# Patient Record
Sex: MALE | Race: OTHER | Employment: FULL TIME | URBAN - METROPOLITAN AREA
[De-identification: names, ages, dates, MRNs, and addresses within clinical notes are randomized per-mention and may not be internally consistent; named-entity substitution may affect disease eponyms.]

---

## 2017-01-04 ENCOUNTER — OFFICE VISIT (OUTPATIENT)
Dept: BEHAVIORAL/MENTAL HEALTH | Age: 52
End: 2017-01-04

## 2017-01-04 ENCOUNTER — TELEPHONE (OUTPATIENT)
Dept: BEHAVIORAL/MENTAL HEALTH | Age: 52
End: 2017-01-04

## 2017-01-04 DIAGNOSIS — G47.00 INSOMNIA, UNSPECIFIED TYPE: ICD-10-CM

## 2017-01-04 DIAGNOSIS — G47.30 SLEEP APNEA, UNSPECIFIED TYPE: Primary | ICD-10-CM

## 2017-01-04 PROCEDURE — 90791 PSYCH DIAGNOSTIC EVALUATION: CPT | Performed by: PSYCHOLOGIST

## 2017-01-04 ASSESSMENT — PATIENT HEALTH QUESTIONNAIRE - PHQ9
4. FEELING TIRED OR HAVING LITTLE ENERGY: 1
SUM OF ALL RESPONSES TO PHQ9 QUESTIONS 1 & 2: 1
2. FEELING DOWN, DEPRESSED OR HOPELESS: 1
6. FEELING BAD ABOUT YOURSELF - OR THAT YOU ARE A FAILURE OR HAVE LET YOURSELF OR YOUR FAMILY DOWN: 0
5. POOR APPETITE OR OVEREATING: 1
SUM OF ALL RESPONSES TO PHQ QUESTIONS 1-9: 5
1. LITTLE INTEREST OR PLEASURE IN DOING THINGS: 0
3. TROUBLE FALLING OR STAYING ASLEEP: 1
9. THOUGHTS THAT YOU WOULD BE BETTER OFF DEAD, OR OF HURTING YOURSELF: 0
7. TROUBLE CONCENTRATING ON THINGS, SUCH AS READING THE NEWSPAPER OR WATCHING TELEVISION: 1
8. MOVING OR SPEAKING SO SLOWLY THAT OTHER PEOPLE COULD HAVE NOTICED. OR THE OPPOSITE, BEING SO FIGETY OR RESTLESS THAT YOU HAVE BEEN MOVING AROUND A LOT MORE THAN USUAL: 0
10. IF YOU CHECKED OFF ANY PROBLEMS, HOW DIFFICULT HAVE THESE PROBLEMS MADE IT FOR YOU TO DO YOUR WORK, TAKE CARE OF THINGS AT HOME, OR GET ALONG WITH OTHER PEOPLE: 1

## 2017-01-12 ENCOUNTER — OFFICE VISIT (OUTPATIENT)
Dept: PRIMARY CARE CLINIC | Age: 52
End: 2017-01-12

## 2017-01-12 VITALS
BODY MASS INDEX: 33.07 KG/M2 | WEIGHT: 231 LBS | HEIGHT: 70 IN | OXYGEN SATURATION: 96 % | SYSTOLIC BLOOD PRESSURE: 136 MMHG | HEART RATE: 107 BPM | DIASTOLIC BLOOD PRESSURE: 68 MMHG | TEMPERATURE: 98.5 F

## 2017-01-12 DIAGNOSIS — J01.00 SUBACUTE MAXILLARY SINUSITIS: ICD-10-CM

## 2017-01-12 DIAGNOSIS — J20.9 ACUTE BRONCHITIS, UNSPECIFIED ORGANISM: Primary | ICD-10-CM

## 2017-01-12 PROCEDURE — 99213 OFFICE O/P EST LOW 20 MIN: CPT | Performed by: FAMILY MEDICINE

## 2017-01-12 PROCEDURE — 96372 THER/PROPH/DIAG INJ SC/IM: CPT | Performed by: FAMILY MEDICINE

## 2017-01-12 RX ORDER — CEFTRIAXONE 1 G/1
1 INJECTION, POWDER, FOR SOLUTION INTRAMUSCULAR; INTRAVENOUS ONCE
Status: COMPLETED | OUTPATIENT
Start: 2017-01-12 | End: 2017-01-12

## 2017-01-12 RX ORDER — AZITHROMYCIN 250 MG/1
TABLET, FILM COATED ORAL
Qty: 1 PACKET | Refills: 0 | Status: SHIPPED | OUTPATIENT
Start: 2017-01-12 | End: 2017-01-16 | Stop reason: SDUPTHER

## 2017-01-12 RX ORDER — BENZONATATE 100 MG/1
200 CAPSULE ORAL 3 TIMES DAILY PRN
Qty: 50 CAPSULE | Refills: 1 | Status: SHIPPED | OUTPATIENT
Start: 2017-01-12 | End: 2017-01-16 | Stop reason: SDUPTHER

## 2017-01-12 RX ADMIN — CEFTRIAXONE 1 G: 1 INJECTION, POWDER, FOR SOLUTION INTRAMUSCULAR; INTRAVENOUS at 12:01

## 2017-01-12 ASSESSMENT — ENCOUNTER SYMPTOMS
VOICE CHANGE: 1
SHORTNESS OF BREATH: 1
SORE THROAT: 1
COUGH: 1
ABDOMINAL PAIN: 0

## 2017-01-16 ENCOUNTER — TELEPHONE (OUTPATIENT)
Dept: PRIMARY CARE CLINIC | Age: 52
End: 2017-01-16

## 2017-01-16 RX ORDER — AZITHROMYCIN 250 MG/1
TABLET, FILM COATED ORAL
Qty: 1 PACKET | Refills: 0 | OUTPATIENT
Start: 2017-01-16 | End: 2017-01-24

## 2017-01-16 RX ORDER — BENZONATATE 100 MG/1
200 CAPSULE ORAL 3 TIMES DAILY PRN
Qty: 50 CAPSULE | Refills: 0 | OUTPATIENT
Start: 2017-01-16 | End: 2017-01-23

## 2017-01-24 ENCOUNTER — OFFICE VISIT (OUTPATIENT)
Dept: PRIMARY CARE CLINIC | Age: 52
End: 2017-01-24

## 2017-01-24 VITALS
TEMPERATURE: 98.6 F | SYSTOLIC BLOOD PRESSURE: 132 MMHG | HEIGHT: 70 IN | WEIGHT: 214 LBS | BODY MASS INDEX: 30.64 KG/M2 | HEART RATE: 116 BPM | DIASTOLIC BLOOD PRESSURE: 78 MMHG | RESPIRATION RATE: 18 BRPM | OXYGEN SATURATION: 96 %

## 2017-01-24 DIAGNOSIS — J01.00 SUBACUTE MAXILLARY SINUSITIS: ICD-10-CM

## 2017-01-24 DIAGNOSIS — J45.20 RAD (REACTIVE AIRWAY DISEASE), MILD INTERMITTENT, UNCOMPLICATED: ICD-10-CM

## 2017-01-24 DIAGNOSIS — R05.9 COUGH: ICD-10-CM

## 2017-01-24 DIAGNOSIS — J20.8 ACUTE BRONCHITIS DUE TO OTHER SPECIFIED ORGANISMS: Primary | ICD-10-CM

## 2017-01-24 PROBLEM — J45.909 RAD (REACTIVE AIRWAY DISEASE): Status: ACTIVE | Noted: 2017-01-24

## 2017-01-24 PROCEDURE — 99214 OFFICE O/P EST MOD 30 MIN: CPT | Performed by: FAMILY MEDICINE

## 2017-01-24 PROCEDURE — 96372 THER/PROPH/DIAG INJ SC/IM: CPT | Performed by: FAMILY MEDICINE

## 2017-01-24 RX ORDER — PREDNISONE 10 MG/1
TABLET ORAL
Qty: 30 TABLET | Refills: 0 | Status: SHIPPED | OUTPATIENT
Start: 2017-01-24 | End: 2017-02-02

## 2017-01-24 RX ORDER — CEFTRIAXONE 1 G/1
1 INJECTION, POWDER, FOR SOLUTION INTRAMUSCULAR; INTRAVENOUS ONCE
Status: COMPLETED | OUTPATIENT
Start: 2017-01-24 | End: 2017-01-24

## 2017-01-24 RX ORDER — CEFDINIR 300 MG/1
300 CAPSULE ORAL 2 TIMES DAILY
Qty: 20 CAPSULE | Refills: 0 | Status: SHIPPED | OUTPATIENT
Start: 2017-01-24 | End: 2017-02-02

## 2017-01-24 RX ADMIN — CEFTRIAXONE 1 G: 1 INJECTION, POWDER, FOR SOLUTION INTRAMUSCULAR; INTRAVENOUS at 11:05

## 2017-01-24 ASSESSMENT — ENCOUNTER SYMPTOMS
SHORTNESS OF BREATH: 1
COUGH: 1
SORE THROAT: 1

## 2017-01-28 ENCOUNTER — OFFICE VISIT (OUTPATIENT)
Dept: PRIMARY CARE CLINIC | Age: 52
End: 2017-01-28

## 2017-01-28 VITALS
TEMPERATURE: 98.1 F | BODY MASS INDEX: 30.35 KG/M2 | OXYGEN SATURATION: 93 % | DIASTOLIC BLOOD PRESSURE: 78 MMHG | HEART RATE: 110 BPM | SYSTOLIC BLOOD PRESSURE: 128 MMHG | RESPIRATION RATE: 18 BRPM | HEIGHT: 70 IN | WEIGHT: 212 LBS

## 2017-01-28 DIAGNOSIS — R05.9 COUGH: Primary | ICD-10-CM

## 2017-01-28 DIAGNOSIS — E78.5 DYSLIPIDEMIA: ICD-10-CM

## 2017-01-28 DIAGNOSIS — F10.21 RECOVERING ALCOHOLIC IN REMISSION (HCC): ICD-10-CM

## 2017-01-28 PROCEDURE — 99213 OFFICE O/P EST LOW 20 MIN: CPT | Performed by: FAMILY MEDICINE

## 2017-01-28 RX ORDER — LEVOFLOXACIN 500 MG/1
500 TABLET, FILM COATED ORAL DAILY
Qty: 10 TABLET | Refills: 0 | Status: SHIPPED | OUTPATIENT
Start: 2017-01-28 | End: 2017-10-24 | Stop reason: SDUPTHER

## 2017-01-28 ASSESSMENT — ENCOUNTER SYMPTOMS
CHEST TIGHTNESS: 1
WHEEZING: 1
COUGH: 1
SORE THROAT: 1
SHORTNESS OF BREATH: 1

## 2017-02-02 ENCOUNTER — OFFICE VISIT (OUTPATIENT)
Dept: PRIMARY CARE CLINIC | Age: 52
End: 2017-02-02

## 2017-02-02 VITALS
RESPIRATION RATE: 16 BRPM | HEART RATE: 110 BPM | SYSTOLIC BLOOD PRESSURE: 130 MMHG | DIASTOLIC BLOOD PRESSURE: 86 MMHG | BODY MASS INDEX: 31.21 KG/M2 | TEMPERATURE: 98.7 F | OXYGEN SATURATION: 94 % | HEIGHT: 70 IN | WEIGHT: 218 LBS

## 2017-02-02 DIAGNOSIS — J20.9 ACUTE BRONCHITIS, UNSPECIFIED ORGANISM: Primary | ICD-10-CM

## 2017-02-02 DIAGNOSIS — J45.40 RAD (REACTIVE AIRWAY DISEASE), MODERATE PERSISTENT, UNCOMPLICATED: ICD-10-CM

## 2017-02-02 DIAGNOSIS — R05.9 COUGH: ICD-10-CM

## 2017-02-02 PROCEDURE — 99214 OFFICE O/P EST MOD 30 MIN: CPT | Performed by: FAMILY MEDICINE

## 2017-02-02 PROCEDURE — 96372 THER/PROPH/DIAG INJ SC/IM: CPT | Performed by: FAMILY MEDICINE

## 2017-02-02 RX ORDER — CEFTRIAXONE 1 G/1
1 INJECTION, POWDER, FOR SOLUTION INTRAMUSCULAR; INTRAVENOUS ONCE
Status: COMPLETED | OUTPATIENT
Start: 2017-02-02 | End: 2017-02-02

## 2017-02-02 RX ORDER — ALBUTEROL SULFATE 2.5 MG/3ML
2.5 SOLUTION RESPIRATORY (INHALATION) EVERY 4 HOURS PRN
Qty: 100 VIAL | Refills: 3 | Status: SHIPPED | OUTPATIENT
Start: 2017-02-02 | End: 2019-11-25 | Stop reason: SDUPTHER

## 2017-02-02 RX ADMIN — CEFTRIAXONE 1 G: 1 INJECTION, POWDER, FOR SOLUTION INTRAMUSCULAR; INTRAVENOUS at 14:19

## 2017-02-02 ASSESSMENT — ENCOUNTER SYMPTOMS
WHEEZING: 1
SHORTNESS OF BREATH: 1
COUGH: 1
CHEST TIGHTNESS: 1

## 2017-02-12 ASSESSMENT — ENCOUNTER SYMPTOMS
APNEA: 0
ANAL BLEEDING: 0
ABDOMINAL PAIN: 0
ABDOMINAL DISTENTION: 0

## 2017-10-24 ENCOUNTER — OFFICE VISIT (OUTPATIENT)
Dept: PRIMARY CARE CLINIC | Age: 52
End: 2017-10-24

## 2017-10-24 VITALS
BODY MASS INDEX: 30.49 KG/M2 | HEART RATE: 96 BPM | OXYGEN SATURATION: 96 % | RESPIRATION RATE: 14 BRPM | HEIGHT: 70 IN | DIASTOLIC BLOOD PRESSURE: 88 MMHG | SYSTOLIC BLOOD PRESSURE: 128 MMHG | WEIGHT: 213 LBS

## 2017-10-24 DIAGNOSIS — Z12.11 SCREENING FOR COLON CANCER: ICD-10-CM

## 2017-10-24 DIAGNOSIS — J45.40 MODERATE PERSISTENT REACTIVE AIRWAY DISEASE WITHOUT COMPLICATION: Primary | ICD-10-CM

## 2017-10-24 DIAGNOSIS — J40 BRONCHITIS: ICD-10-CM

## 2017-10-24 DIAGNOSIS — R05.9 COUGH: ICD-10-CM

## 2017-10-24 PROCEDURE — 99214 OFFICE O/P EST MOD 30 MIN: CPT | Performed by: FAMILY MEDICINE

## 2017-10-24 RX ORDER — BENZONATATE 100 MG/1
100 CAPSULE ORAL 3 TIMES DAILY PRN
Qty: 100 CAPSULE | Refills: 0 | Status: SHIPPED | OUTPATIENT
Start: 2017-10-24 | End: 2018-01-04

## 2017-10-24 RX ORDER — ALBUTEROL SULFATE 90 UG/1
2 AEROSOL, METERED RESPIRATORY (INHALATION) EVERY 6 HOURS PRN
Qty: 1 INHALER | Refills: 3 | Status: SHIPPED | OUTPATIENT
Start: 2017-10-24 | End: 2017-12-18 | Stop reason: SDUPTHER

## 2017-10-24 RX ORDER — PREDNISONE 10 MG/1
TABLET ORAL
Qty: 30 TABLET | Refills: 0 | Status: SHIPPED | OUTPATIENT
Start: 2017-10-24 | End: 2017-12-18 | Stop reason: ALTCHOICE

## 2017-10-24 RX ORDER — LEVOFLOXACIN 500 MG/1
500 TABLET, FILM COATED ORAL DAILY
Qty: 10 TABLET | Refills: 0 | Status: SHIPPED | OUTPATIENT
Start: 2017-10-24 | End: 2017-11-15 | Stop reason: SDUPTHER

## 2017-10-24 ASSESSMENT — ENCOUNTER SYMPTOMS
RHINORRHEA: 1
SHORTNESS OF BREATH: 1
WHEEZING: 1
SORE THROAT: 0
HEMOPTYSIS: 0
HEARTBURN: 0
COUGH: 1

## 2017-11-15 ENCOUNTER — OFFICE VISIT (OUTPATIENT)
Dept: PRIMARY CARE CLINIC | Age: 52
End: 2017-11-15

## 2017-11-15 VITALS
SYSTOLIC BLOOD PRESSURE: 110 MMHG | RESPIRATION RATE: 14 BRPM | TEMPERATURE: 97.9 F | WEIGHT: 210.8 LBS | DIASTOLIC BLOOD PRESSURE: 82 MMHG | HEIGHT: 71 IN | HEART RATE: 82 BPM | BODY MASS INDEX: 29.51 KG/M2

## 2017-11-15 DIAGNOSIS — J45.40 MODERATE PERSISTENT REACTIVE AIRWAY DISEASE WITHOUT COMPLICATION: Primary | ICD-10-CM

## 2017-11-15 DIAGNOSIS — J40 BRONCHITIS: ICD-10-CM

## 2017-11-15 DIAGNOSIS — E78.5 DYSLIPIDEMIA: ICD-10-CM

## 2017-11-15 PROCEDURE — 99214 OFFICE O/P EST MOD 30 MIN: CPT | Performed by: FAMILY MEDICINE

## 2017-11-15 RX ORDER — LEVOFLOXACIN 500 MG/1
500 TABLET, FILM COATED ORAL DAILY
Qty: 10 TABLET | Refills: 0 | Status: SHIPPED | OUTPATIENT
Start: 2017-11-15 | End: 2017-11-25

## 2017-11-15 RX ORDER — PREDNISONE 10 MG/1
TABLET ORAL
Qty: 30 TABLET | Refills: 0 | Status: SHIPPED | OUTPATIENT
Start: 2017-11-15 | End: 2017-11-29

## 2017-11-15 ASSESSMENT — ENCOUNTER SYMPTOMS
HEMOPTYSIS: 0
COUGH: 1
SHORTNESS OF BREATH: 0

## 2017-11-15 NOTE — PROGRESS NOTES
Subjective:      Patient ID: Loco Amador is a 45 y.o. male who presents today for:  Chief Complaint   Patient presents with    Cough     pt is back for follow up of bronchitis but states he now has a cough that wont go away. Cough   This is a recurrent problem. The current episode started more than 1 month ago. The problem has been unchanged. The problem occurs hourly. The cough is non-productive. Pertinent negatives include no chest pain, chills, ear congestion, ear pain, headaches, hemoptysis, myalgias, nasal congestion or shortness of breath. He has tried steroid inhaler, rest, oral steroids and prescription cough suppressant for the symptoms. The treatment provided mild relief. Fu rad,lipids    Chronic,stable  Past Medical History:   Diagnosis Date    Chest pain     stress test neg    Dyslipidemia 1/28/2017    ETOH abuse     quit, he is a counsular, [address] [place] [address],[address] alcoholic in remission ([facility] [address] 1/28/2017    Vicodin use disorder, mild     quit     Past Surgical History:   Procedure Laterality Date    APPENDECTOMY  1982    CARDIAC CATHETERIZATION  2007    SHOULDER SURGERY      left 2002 right 2010     No family history on file. Social History     Social History    Marital status: Single     Spouse name: N/A    Number of children: N/A    Years of education: N/A     Occupational History    Not on file. Social History Main Topics    Smoking status: Never Smoker    Smokeless tobacco: Never Used    Alcohol use No      Comment: quit 2008    Drug use: No    Sexual activity: Yes     Other Topics Concern    Not on file     Social History Narrative    No narrative on file     Allergies:  Seasonal    Review of Systems   Constitutional: Negative for chills. HENT: Negative for ear pain. Respiratory: Positive for cough. Negative for hemoptysis and shortness of breath. Cardiovascular: Negative for chest pain. Musculoskeletal: Negative for myalgias.    Neurological: Negative for dizziness and headaches. Objective:   /82 (Site: Left Arm, Position: Sitting, Cuff Size: Large Adult)   Pulse 82   Temp 97.9 °F (36.6 °C) (Tympanic)   Resp 14   Ht 5' 11\" (1.803 m)   Wt 210 lb 12.8 oz (95.6 kg)   BMI 29.40 kg/m²     Physical Exam      PHYSICAL EXAMINATION:   VITAL SIGNS: are as recorded. GENERAL:  The patient appears well nourished and well-developed, and with normal affect. No acute respiratory distress. Alert and oriented times 3. No skin rashes. HEENT:  TMs normal bilaterally. Canals and ears normal. Pharynx is clear. Extraocular eye motions intact and pain free. Pupils reactive and equally round. Sclerae and conjunctivae clear, normocephalic and atraumatic. RESPIRATORY:  Clear and equal breath sounds with no acute respiratory distress. HEART: Regular rhythm without murmur, rub or gallop. ABDOMEN:  soft, nontender. No masses, guarding or rebound. Normoactive bowel sounds. NECK: No masses or adenopathy palpable. No bruits heard. No asymmetry visible. LOW BACK: No tenderness to palpation of inferior lumbar spine or either sacroiliac joint area. Assessment:     1. Moderate persistent reactive airway disease without complication  tiotropium (SPIRIVA RESPIMAT) 2.5 MCG/ACT AERS inhaler    CT Chest W Contrast    CANCELED: Ambulatory referral to Pulmonology   2. Bronchitis  levofloxacin (LEVAQUIN) 500 MG tablet   3.  Dyslipidemia         Plan:      Orders Placed This Encounter   Procedures    CT Chest W Contrast     Standing Status:   Future     Standing Expiration Date:   11/15/2018     Orders Placed This Encounter   Medications    tiotropium (SPIRIVA RESPIMAT) 2.5 MCG/ACT AERS inhaler     Sig: Inhale 2 puffs into the lungs daily     Dispense:  2 Inhaler     Refill:  0    levofloxacin (LEVAQUIN) 500 MG tablet     Sig: Take 1 tablet by mouth daily for 10 days     Dispense:  10 tablet     Refill:  0    predniSONE (DELTASONE) 10 MG tablet Sig: Take one tablet 4 times a day for 3 days, then one tablet 3 times a day for 3 days, then take one tablet 2 times a day for 3 days, and finally take one tablet 1 times a day for three days. (1S7,5I7,6S5,3Y4)     Dispense:  30 tablet     Refill:  0      PFT next    Consider pulm consult next    Controlled Substances Monitoring:      Return in about 3 weeks (around 12/6/2017). YUNIOR Ramirez   , am scribing for and in the presence of Grayson Saab MD. Electronically signed by :  Carroll Noble. Grayson Otto MD, personally performed the services described in this documentation, as scribed by Carroll Noble. YUNIOR Melara    in my presence, and it is both accurate and complete.  Electronically signed by: Grayson Saab MD    11/16/17 5:58 AM    Grayson Saab MD

## 2017-12-08 ENCOUNTER — OFFICE VISIT (OUTPATIENT)
Dept: PRIMARY CARE CLINIC | Age: 52
End: 2017-12-08

## 2017-12-08 VITALS
BODY MASS INDEX: 28.61 KG/M2 | OXYGEN SATURATION: 97 % | DIASTOLIC BLOOD PRESSURE: 86 MMHG | HEART RATE: 112 BPM | WEIGHT: 204.4 LBS | RESPIRATION RATE: 22 BRPM | HEIGHT: 71 IN | SYSTOLIC BLOOD PRESSURE: 132 MMHG | TEMPERATURE: 97.7 F

## 2017-12-08 DIAGNOSIS — J40 BRONCHITIS: ICD-10-CM

## 2017-12-08 DIAGNOSIS — J45.41 MODERATE PERSISTENT REACTIVE AIRWAY DISEASE WITH ACUTE EXACERBATION: Primary | ICD-10-CM

## 2017-12-08 DIAGNOSIS — R05.9 COUGH: ICD-10-CM

## 2017-12-08 DIAGNOSIS — E78.5 DYSLIPIDEMIA: ICD-10-CM

## 2017-12-08 PROCEDURE — 99214 OFFICE O/P EST MOD 30 MIN: CPT | Performed by: FAMILY MEDICINE

## 2017-12-08 PROCEDURE — 96372 THER/PROPH/DIAG INJ SC/IM: CPT | Performed by: FAMILY MEDICINE

## 2017-12-08 RX ORDER — LEVOFLOXACIN 500 MG/1
500 TABLET, FILM COATED ORAL DAILY
Qty: 10 TABLET | Refills: 0 | Status: SHIPPED | OUTPATIENT
Start: 2017-12-08 | End: 2017-12-18 | Stop reason: ALTCHOICE

## 2017-12-08 RX ORDER — TRIAMCINOLONE ACETONIDE 40 MG/ML
80 INJECTION, SUSPENSION INTRA-ARTICULAR; INTRAMUSCULAR ONCE
Status: COMPLETED | OUTPATIENT
Start: 2017-12-08 | End: 2017-12-08

## 2017-12-08 RX ORDER — PREDNISONE 10 MG/1
TABLET ORAL
Qty: 30 TABLET | Refills: 0 | Status: SHIPPED | OUTPATIENT
Start: 2017-12-08 | End: 2017-12-18 | Stop reason: ALTCHOICE

## 2017-12-08 RX ORDER — BENZONATATE 100 MG/1
100 CAPSULE ORAL 3 TIMES DAILY PRN
Qty: 100 CAPSULE | Refills: 1 | Status: SHIPPED | OUTPATIENT
Start: 2017-12-08 | End: 2017-12-18 | Stop reason: SDUPTHER

## 2017-12-08 RX ORDER — CEFTRIAXONE 1 G/1
1 INJECTION, POWDER, FOR SOLUTION INTRAMUSCULAR; INTRAVENOUS ONCE
Status: COMPLETED | OUTPATIENT
Start: 2017-12-08 | End: 2017-12-08

## 2017-12-08 RX ADMIN — TRIAMCINOLONE ACETONIDE 80 MG: 40 INJECTION, SUSPENSION INTRA-ARTICULAR; INTRAMUSCULAR at 11:31

## 2017-12-08 RX ADMIN — CEFTRIAXONE 1 G: 1 INJECTION, POWDER, FOR SOLUTION INTRAMUSCULAR; INTRAVENOUS at 11:30

## 2017-12-08 ASSESSMENT — ENCOUNTER SYMPTOMS
SHORTNESS OF BREATH: 1
WHEEZING: 0
CHEST TIGHTNESS: 0
COUGH: 1

## 2017-12-08 NOTE — PROGRESS NOTES
right 2010     No family history on file. Social History     Social History    Marital status: Single     Spouse name: N/A    Number of children: N/A    Years of education: N/A     Occupational History    Not on file. Social History Main Topics    Smoking status: Never Smoker    Smokeless tobacco: Never Used    Alcohol use No      Comment: quit 2008    Drug use: No    Sexual activity: Yes     Other Topics Concern    Not on file     Social History Narrative    No narrative on file     Allergies:  Seasonal    Review of Systems   Constitutional: Negative for chills, fever and weight loss. HENT: Negative for ear pain and postnasal drip. Respiratory: Positive for cough and shortness of breath. Negative for chest tightness and wheezing. Cardiovascular: Negative for chest pain. Neurological: Negative for dizziness and headaches. Objective:   /86 (Site: Left Arm, Position: Sitting, Cuff Size: Medium Adult)   Pulse 112   Temp 97.7 °F (36.5 °C) (Temporal)   Resp 22   Ht 5' 11\" (1.803 m)   Wt 204 lb 6.4 oz (92.7 kg)   SpO2 97%   BMI 28.51 kg/m²     Physical Exam      PHYSICAL EXAMINATION:   VITAL SIGNS: are as recorded. GENERAL:  The patient appears well nourished and well-developed, and with normal affect. No acute respiratory distress. Alert and oriented times 3. No skin rashes. HEENT:  TMs normal bilaterally. Canals and ears normal. Pharynx is clear. Extraocular eye motions intact and pain free. Pupils reactive and equally round. Sclerae and conjunctivae clear, normocephalic and atraumatic. RESPIRATORY:  Clear and equal breath sounds with no acute respiratory distress. HEART: Regular rhythm without murmur, rub or gallop. ABDOMEN:  soft, nontender. No masses, guarding or rebound. Normoactive bowel sounds. LOW BACK: No tenderness to palpation of inferior lumbar spine or either sacroiliac joint area. Assessment:     1.  Moderate persistent reactive

## 2017-12-14 ENCOUNTER — HOSPITAL ENCOUNTER (OUTPATIENT)
Dept: CT IMAGING | Age: 52
Discharge: HOME | End: 2017-12-14
Payer: COMMERCIAL

## 2017-12-14 DIAGNOSIS — J45.40 MODERATE PERSISTENT REACTIVE AIRWAY DISEASE WITHOUT COMPLICATION: ICD-10-CM

## 2017-12-14 PROCEDURE — 6360000004 HC RX CONTRAST MEDICATION: Performed by: RADIOLOGY

## 2017-12-14 PROCEDURE — 71260 CT THORAX DX C+: CPT

## 2017-12-14 RX ADMIN — IOVERSOL 75 ML: 678 INJECTION INTRA-ARTERIAL; INTRAVENOUS at 10:08

## 2017-12-15 ENCOUNTER — OFFICE VISIT (OUTPATIENT)
Dept: PRIMARY CARE CLINIC | Age: 52
End: 2017-12-15

## 2017-12-15 VITALS
HEART RATE: 120 BPM | RESPIRATION RATE: 16 BRPM | TEMPERATURE: 97.9 F | BODY MASS INDEX: 27.86 KG/M2 | DIASTOLIC BLOOD PRESSURE: 86 MMHG | SYSTOLIC BLOOD PRESSURE: 124 MMHG | HEIGHT: 71 IN | WEIGHT: 199 LBS

## 2017-12-15 DIAGNOSIS — R53.83 FATIGUE, UNSPECIFIED TYPE: ICD-10-CM

## 2017-12-15 DIAGNOSIS — R05.9 COUGH: ICD-10-CM

## 2017-12-15 DIAGNOSIS — J45.41 MODERATE PERSISTENT REACTIVE AIRWAY DISEASE WITH ACUTE EXACERBATION: Primary | ICD-10-CM

## 2017-12-15 DIAGNOSIS — E78.5 DYSLIPIDEMIA: ICD-10-CM

## 2017-12-15 DIAGNOSIS — Z12.5 PROSTATE CANCER SCREENING: ICD-10-CM

## 2017-12-15 DIAGNOSIS — J40 BRONCHITIS: ICD-10-CM

## 2017-12-15 PROCEDURE — 99214 OFFICE O/P EST MOD 30 MIN: CPT | Performed by: FAMILY MEDICINE

## 2017-12-15 RX ORDER — AMOXICILLIN AND CLAVULANATE POTASSIUM 875; 125 MG/1; MG/1
1 TABLET, FILM COATED ORAL 2 TIMES DAILY
Qty: 20 TABLET | Refills: 0 | Status: SHIPPED | OUTPATIENT
Start: 2017-12-15 | End: 2017-12-25

## 2017-12-15 ASSESSMENT — ENCOUNTER SYMPTOMS
SHORTNESS OF BREATH: 0
HEMOPTYSIS: 0
HEARTBURN: 0
SORE THROAT: 0
WHEEZING: 0
RHINORRHEA: 0
COUGH: 1

## 2017-12-15 NOTE — PROGRESS NOTES
amoxicillin-clavulanate (AUGMENTIN) 875-125 MG per tablet   3. Cough  [address] Catarino Romano MD    amoxicillin-clavulanate (AUGMENTIN) 875-125 MG per tablet   4. Fatigue, unspecified type  Comprehensive Metabolic Panel    CBC Auto Differential    Vitamin D 25 Hydroxy   5. Dyslipidemia  Comprehensive Metabolic Panel    Lipid Panel   6. Prostate cancer screening  Psa screening       Plan:      Orders Placed This Encounter   Procedures    Comprehensive Metabolic Panel     Standing Status:   Future     Number of Occurrences:   1     Standing Expiration Date:   12/15/2018    CBC Auto Differential     Standing Status:   Future     Number of Occurrences:   1     Standing Expiration Date:   12/15/2018    Lipid Panel     Standing Status:   Future     Number of Occurrences:   1     Standing Expiration Date:   12/15/2018     Order Specific Question:   Is Patient Fasting?/# of Hours     Answer:   yes / 12 hrs    Psa screening     Standing Status:   Future     Number of Occurrences:   1     Standing Expiration Date:   12/15/2018    Vitamin D 25 Hydroxy     Standing Status:   Future     Number of Occurrences:   1     Standing Expiration Date:   12/15/2018   [address] - Fina Ballesteros MD     Referral Priority:   Routine     Referral Type:   Consult for Advice and Opinion     Referral Reason:   Specialty Services Required     Referred to Provider:   Fina Ballesteros MD     Requested Specialty:   Pulmonology     Number of Visits Requested:   1     Orders Placed This Encounter   Medications    amoxicillin-clavulanate (AUGMENTIN) 875-125 MG per tablet     Sig: Take 1 tablet by mouth 2 times daily for 10 days     Dispense:  20 tablet     Refill:  0       Controlled Substances Monitoring:      Return in about 3 weeks (around 1/3/2018).     Trent ROSEN RMA  , am scribing for and in the presence of Susanna Kohler MD. Electronically signed by :  Wyatt No

## 2017-12-16 LAB
ALBUMIN SERPL-MCNC: 5 G/DL (ref 3.9–4.9)
ALP BLD-CCNC: 132 U/L (ref 35–104)
ALT SERPL-CCNC: 49 U/L (ref 0–41)
ANION GAP SERPL CALCULATED.3IONS-SCNC: 18 MEQ/L (ref 7–13)
AST SERPL-CCNC: 23 U/L (ref 0–40)
BASOPHILS ABSOLUTE: 0 K/UL (ref 0–0.2)
BASOPHILS RELATIVE PERCENT: 0.3 %
BILIRUB SERPL-MCNC: 0.7 MG/DL (ref 0–1.2)
BUN BLDV-MCNC: 18 MG/DL (ref 6–20)
CALCIUM SERPL-MCNC: 9.7 MG/DL (ref 8.6–10.2)
CHLORIDE BLD-SCNC: 96 MEQ/L (ref 98–107)
CHOLESTEROL, TOTAL: 311 MG/DL (ref 0–199)
CO2: 26 MEQ/L (ref 22–29)
CREAT SERPL-MCNC: 1.01 MG/DL (ref 0.7–1.2)
EOSINOPHILS ABSOLUTE: 0 K/UL (ref 0–0.7)
EOSINOPHILS RELATIVE PERCENT: 0.3 %
GFR AFRICAN AMERICAN: >60
GFR NON-AFRICAN AMERICAN: >60
GLOBULIN: 2.7 G/DL (ref 2.3–3.5)
GLUCOSE BLD-MCNC: 369 MG/DL (ref 74–109)
HCT VFR BLD CALC: 52.2 % (ref 42–52)
HDLC SERPL-MCNC: 53 MG/DL (ref 40–59)
HEMOGLOBIN: 17.7 G/DL (ref 14–18)
LDL CHOLESTEROL CALCULATED: 214 MG/DL (ref 0–129)
LYMPHOCYTES ABSOLUTE: 1.8 K/UL (ref 1–4.8)
LYMPHOCYTES RELATIVE PERCENT: 18.6 %
MCH RBC QN AUTO: 32.8 PG (ref 27–31.3)
MCHC RBC AUTO-ENTMCNC: 33.8 % (ref 33–37)
MCV RBC AUTO: 96.9 FL (ref 80–100)
MONOCYTES ABSOLUTE: 0.5 K/UL (ref 0.2–0.8)
MONOCYTES RELATIVE PERCENT: 5.7 %
NEUTROPHILS ABSOLUTE: 7.1 K/UL (ref 1.4–6.5)
NEUTROPHILS RELATIVE PERCENT: 75.1 %
PDW BLD-RTO: 13.3 % (ref 11.5–14.5)
PLATELET # BLD: 228 K/UL (ref 130–400)
POTASSIUM SERPL-SCNC: 4.5 MEQ/L (ref 3.5–5.1)
PROSTATE SPECIFIC ANTIGEN: 0.67 NG/ML (ref 0–3.89)
RBC # BLD: 5.39 M/UL (ref 4.7–6.1)
SLIDE REVIEW: ABNORMAL
SODIUM BLD-SCNC: 140 MEQ/L (ref 132–144)
TOTAL PROTEIN: 7.7 G/DL (ref 6.4–8.1)
TRIGL SERPL-MCNC: 218 MG/DL (ref 0–200)
VITAMIN D 25-HYDROXY: 16.8 NG/ML (ref 30–100)
WBC # BLD: 9.5 K/UL (ref 4.8–10.8)

## 2017-12-18 ENCOUNTER — OFFICE VISIT (OUTPATIENT)
Dept: PRIMARY CARE CLINIC | Age: 52
End: 2017-12-18

## 2017-12-18 VITALS
HEART RATE: 94 BPM | OXYGEN SATURATION: 94 % | DIASTOLIC BLOOD PRESSURE: 82 MMHG | WEIGHT: 199 LBS | HEIGHT: 71 IN | RESPIRATION RATE: 16 BRPM | SYSTOLIC BLOOD PRESSURE: 108 MMHG | BODY MASS INDEX: 27.86 KG/M2 | TEMPERATURE: 98.1 F

## 2017-12-18 DIAGNOSIS — R73.9 HYPERGLYCEMIA: Primary | ICD-10-CM

## 2017-12-18 DIAGNOSIS — E11.69 DM TYPE 2 WITH DIABETIC DYSLIPIDEMIA (HCC): ICD-10-CM

## 2017-12-18 DIAGNOSIS — E78.5 DM TYPE 2 WITH DIABETIC DYSLIPIDEMIA (HCC): ICD-10-CM

## 2017-12-18 DIAGNOSIS — E78.5 DYSLIPIDEMIA: ICD-10-CM

## 2017-12-18 DIAGNOSIS — J45.41 MODERATE PERSISTENT REACTIVE AIRWAY DISEASE WITH ACUTE EXACERBATION: ICD-10-CM

## 2017-12-18 DIAGNOSIS — R73.9 HYPERGLYCEMIA: ICD-10-CM

## 2017-12-18 LAB
CREATININE URINE: 266.6 MG/DL
HBA1C MFR BLD: 9.3 %
MICROALBUMIN UR-MCNC: 29.2 MG/DL
MICROALBUMIN/CREAT UR-RTO: 109.5 MG/G (ref 0–30)

## 2017-12-18 PROCEDURE — 83036 HEMOGLOBIN GLYCOSYLATED A1C: CPT | Performed by: FAMILY MEDICINE

## 2017-12-18 PROCEDURE — 99214 OFFICE O/P EST MOD 30 MIN: CPT | Performed by: FAMILY MEDICINE

## 2017-12-18 RX ORDER — METFORMIN HYDROCHLORIDE 500 MG/1
TABLET, EXTENDED RELEASE ORAL
Qty: 120 TABLET | Refills: 2 | Status: SHIPPED | OUTPATIENT
Start: 2017-12-18 | End: 2018-10-05

## 2017-12-18 RX ORDER — ATORVASTATIN CALCIUM 10 MG/1
10 TABLET, FILM COATED ORAL DAILY
Qty: 30 TABLET | Refills: 2 | Status: SHIPPED | OUTPATIENT
Start: 2017-12-18 | End: 2018-10-05

## 2017-12-18 ASSESSMENT — ENCOUNTER SYMPTOMS
CHANGE IN BOWEL HABIT: 0
SHORTNESS OF BREATH: 1
COUGH: 1
ABDOMINAL PAIN: 0
NAUSEA: 0

## 2017-12-21 ENCOUNTER — OFFICE VISIT (OUTPATIENT)
Dept: PULMONOLOGY | Age: 52
End: 2017-12-21

## 2017-12-21 VITALS
OXYGEN SATURATION: 96 % | SYSTOLIC BLOOD PRESSURE: 106 MMHG | BODY MASS INDEX: 28 KG/M2 | HEART RATE: 115 BPM | HEIGHT: 71 IN | DIASTOLIC BLOOD PRESSURE: 52 MMHG | WEIGHT: 200 LBS

## 2017-12-21 DIAGNOSIS — J45.40 MODERATE PERSISTENT REACTIVE AIRWAY DISEASE WITHOUT COMPLICATION: ICD-10-CM

## 2017-12-21 DIAGNOSIS — R05.9 COUGH: Primary | ICD-10-CM

## 2017-12-21 DIAGNOSIS — R09.82 POSTNASAL DRIP: ICD-10-CM

## 2017-12-21 PROCEDURE — 99204 OFFICE O/P NEW MOD 45 MIN: CPT | Performed by: INTERNAL MEDICINE

## 2017-12-21 NOTE — PROGRESS NOTES
Subjective:     Tessywillam Campos is a 45 y.o. male who complains today of:     Chief Complaint   Patient presents with    New Patient    Asthma       HPI  Patient having cough which is a chronic problem. She is also having short of breath with cough. Patient did cough comes every few minutes nonproductive. She has no fever or chills no chest pleuritic pain. Patient tried cough suppressant  which provided some relief. Patient said he has a history of asthma. Patient is using Dulera 2 puffs twice a day, Spiriva 1 puff daily , albuterol HFA 2 puffs when necessary and nebulizer with albuterol. Patient no chest pain or pleuritic pain. No fever or chills no nausea vomiting diarrhea or abdominal pain. Allergies:  Seasonal  Past Medical History:   Diagnosis Date    Chest pain     stress test neg    Dyslipidemia 1/28/2017    ETOH abuse     quit, he is a counsular, [address] [place] [address],[address] alcoholic in remission ([facility] [address] 1/28/2017    Vicodin use disorder, mild     quit     Past Surgical History:   Procedure Laterality Date    APPENDECTOMY  1982    CARDIAC CATHETERIZATION  2007    SHOULDER SURGERY      left 2002 right 2010     History reviewed. No pertinent family history. Social History     Social History    Marital status: Single     Spouse name: N/A    Number of children: N/A    Years of education: N/A     Occupational History    Not on file. Social History Main Topics    Smoking status: Never Smoker    Smokeless tobacco: Never Used    Alcohol use No      Comment: quit 2008    Drug use: No    Sexual activity: Yes     Other Topics Concern    Not on file     Social History Narrative    No narrative on file         Review of Systems   Constitutional: Negative for chills, diaphoresis, fatigue and fever. HENT: Negative for congestion, mouth sores, nosebleeds, postnasal drip, rhinorrhea, sneezing, sore throat and voice change. Eyes: Negative for itching and visual disturbance.    Respiratory: CONTRAST:    DATE AND TIME: 12/14/2017 at 9:34 AM.    CLINICAL HISTORY: NONPRODUCTIVE COUGH. CHEST TIGHTNESS. MODERATE PERSISTENT REACTIVE AIRWAY DISEASE WITHOUT COMPLICATION. COMPARISON:  None. TECHNIQUE: Helical CT was performed through the chest utilizing 100-mL of Optiray IV contrast. All CT scans at this facility use dose modulation, iterative reconstruction, and/or weight based dosing when appropriate to reduce radiation dose to as low as   reasonably achievable. FINDINGS: Lung fields are clear. No mediastinal lymphadenopathy. Thoracic aorta is intact. No pleural effusions. No pneumothorax. Bones intact. Visualized upper abdominal structures unremarkable. Remaining visualized structures are unremarkable. Impression NEGATIVE CT OF THE CHEST. Assessment/Plan:     1. Cough  Patient is having chronic cough and according to patient which is slowly improving and much less than before. With the coughing spell he also gets short of breath. He said he has exposed to mold. I will request completed. 2.  Further evaluation to see underlying reactive airway disease. Continue Tessalon Perles and bronchodilator as before  - FULL PFT STUDY WITH PRE AND POST; Future    2. Moderate persistent reactive airway disease without complication  Patient will have a PFT and at this time continue Dulera 2 puff twice a day, Spiriva 1 puff daily, albuterol HFA and nebulizer when necessary. 3. Postnasal drip  Patient having postnasal drip and which is contributing to his cough      Return in about 4 weeks (around 1/18/2018) for cough.       Jose D Vizcaino MD

## 2017-12-23 ENCOUNTER — OFFICE VISIT (OUTPATIENT)
Dept: PRIMARY CARE CLINIC | Age: 52
End: 2017-12-23

## 2017-12-23 VITALS
WEIGHT: 202 LBS | HEART RATE: 97 BPM | HEIGHT: 71 IN | TEMPERATURE: 97.8 F | SYSTOLIC BLOOD PRESSURE: 108 MMHG | DIASTOLIC BLOOD PRESSURE: 70 MMHG | OXYGEN SATURATION: 95 % | BODY MASS INDEX: 28.28 KG/M2 | RESPIRATION RATE: 14 BRPM

## 2017-12-23 DIAGNOSIS — J45.40 MODERATE PERSISTENT REACTIVE AIRWAY DISEASE WITHOUT COMPLICATION: ICD-10-CM

## 2017-12-23 DIAGNOSIS — E78.5 DM TYPE 2 WITH DIABETIC DYSLIPIDEMIA (HCC): Primary | ICD-10-CM

## 2017-12-23 DIAGNOSIS — E11.69 DM TYPE 2 WITH DIABETIC DYSLIPIDEMIA (HCC): Primary | ICD-10-CM

## 2017-12-23 DIAGNOSIS — E78.5 DYSLIPIDEMIA: ICD-10-CM

## 2017-12-23 DIAGNOSIS — E55.9 VITAMIN D DEFICIENCY: ICD-10-CM

## 2017-12-23 PROCEDURE — 99214 OFFICE O/P EST MOD 30 MIN: CPT | Performed by: FAMILY MEDICINE

## 2017-12-23 RX ORDER — CHOLECALCIFEROL (VITAMIN D3) 50 MCG
2000 TABLET ORAL DAILY
Qty: 90 TABLET | Refills: 3
Start: 2017-12-23 | End: 2018-10-05

## 2017-12-23 ASSESSMENT — ENCOUNTER SYMPTOMS
CONSTIPATION: 0
SORE THROAT: 0
COUGH: 1
ABDOMINAL PAIN: 0
SHORTNESS OF BREATH: 1
VOMITING: 0
WHEEZING: 0
NAUSEA: 0
BACK PAIN: 0
DIARRHEA: 0

## 2017-12-23 NOTE — LETTER
[address] [facility] [address] [facility]  [address] [facility] [address]  Phone: [phone]  Fax: Katheryn Rivera MD        December 23, 2017     Patient: Corrie Niño   YOB: 1965   Date of Visit: 12/23/2017       To Whom it May Concern:    Corrie Niño was seen in my clinic on 12/23/2017. He is unable to work 12/8/17-1/1/18. May return to work 1/2/17. If you have any questions or concerns, please don't hesitate to call.     Sincerely,           Karmen Dumont MD

## 2017-12-26 ASSESSMENT — ENCOUNTER SYMPTOMS
COUGH: 1
CHEST TIGHTNESS: 0
WHEEZING: 0
RHINORRHEA: 0
EYE ITCHING: 0
VOMITING: 0
DIARRHEA: 0
ABDOMINAL PAIN: 0
VOICE CHANGE: 0
SHORTNESS OF BREATH: 1
SORE THROAT: 0
NAUSEA: 0

## 2018-01-04 ENCOUNTER — OFFICE VISIT (OUTPATIENT)
Dept: PRIMARY CARE CLINIC | Age: 53
End: 2018-01-04

## 2018-01-04 VITALS
TEMPERATURE: 98.2 F | WEIGHT: 202 LBS | RESPIRATION RATE: 16 BRPM | SYSTOLIC BLOOD PRESSURE: 122 MMHG | DIASTOLIC BLOOD PRESSURE: 82 MMHG | BODY MASS INDEX: 28.28 KG/M2 | HEIGHT: 71 IN | HEART RATE: 94 BPM

## 2018-01-04 DIAGNOSIS — E78.5 DYSLIPIDEMIA: ICD-10-CM

## 2018-01-04 DIAGNOSIS — E11.69 DM TYPE 2 WITH DIABETIC DYSLIPIDEMIA (HCC): Primary | ICD-10-CM

## 2018-01-04 DIAGNOSIS — E78.5 DM TYPE 2 WITH DIABETIC DYSLIPIDEMIA (HCC): Primary | ICD-10-CM

## 2018-01-04 DIAGNOSIS — J45.40 MODERATE PERSISTENT REACTIVE AIRWAY DISEASE WITHOUT COMPLICATION: ICD-10-CM

## 2018-01-04 DIAGNOSIS — Z12.11 SCREENING FOR COLON CANCER: ICD-10-CM

## 2018-01-04 PROCEDURE — 99214 OFFICE O/P EST MOD 30 MIN: CPT | Performed by: FAMILY MEDICINE

## 2018-01-04 RX ORDER — LOSARTAN POTASSIUM 25 MG/1
TABLET ORAL
Qty: 30 TABLET | Refills: 5 | Status: SHIPPED | OUTPATIENT
Start: 2018-01-04 | End: 2018-10-05

## 2018-01-04 ASSESSMENT — ENCOUNTER SYMPTOMS
SHORTNESS OF BREATH: 0
WHEEZING: 0

## 2018-01-04 NOTE — PROGRESS NOTES
Allergies   Allergen Reactions    Seasonal        Review of Systems   Constitutional: Negative for activity change, appetite change, chills, diaphoresis, fatigue, fever, unexpected weight change and weight loss. Eyes: Negative for blurred vision. Respiratory: Negative for shortness of breath and wheezing. Cardiovascular: Negative for chest pain, palpitations and leg swelling. Endocrine: Negative for polydipsia, polyphagia and polyuria. Neurological: Negative for dizziness, weakness, light-headedness and headaches. Vitals:    01/04/18 0743   BP: 122/82   Site: Left Arm   Position: Sitting   Cuff Size: Large Adult   Pulse: 94   Resp: 16   Temp: 98.2 °F (36.8 °C)   Weight: 202 lb (91.6 kg)   Height: 5' 11\" (1.803 m)       Physical Exam       PHYSICAL EXAMINATION:   VITAL SIGNS: are as recorded. GENERAL:  The patient appears well nourished and well-developed, and with normal affect. No acute respiratory distress. Alert and oriented times 3. No skin rashes. HEENT:  TMs normal bilaterally. Canals and ears normal. Pharynx is clear. Extraocular eye motions intact and pain free. Pupils reactive and equally round. Sclerae and conjunctivae clear, normocephalic and atraumatic. RESPIRATORY:  Clear and equal breath sounds with no acute respiratory distress. HEART: Regular rhythm without murmur, rub or gallop. ABDOMEN:  soft, nontender. No masses, guarding or rebound. Normoactive bowel sounds. LOW BACK: No tenderness to palpation of inferior lumbar spine or either sacroiliac joint area. Assessment/Plan  Hannah Valverde was seen today for diabetes and health maintenance. Diagnoses and all orders for this visit:    DM type 2 with diabetic dyslipidemia ([address]    Screening for colon cancer  -     POCT Fecal Immunochemical Test (Fit);  Future    Dyslipidemia    Moderate persistent reactive airway disease without complication    Other orders  -     losartan (COZAAR) 25 MG tablet;

## 2018-01-05 DIAGNOSIS — Z12.11 SCREENING FOR COLON CANCER: ICD-10-CM

## 2018-01-05 LAB
CONTROL: POSITIVE
HEMOCCULT STL QL: NEGATIVE

## 2018-01-05 PROCEDURE — 82274 ASSAY TEST FOR BLOOD FECAL: CPT | Performed by: INTERNAL MEDICINE

## 2018-01-05 ASSESSMENT — ENCOUNTER SYMPTOMS
BLURRED VISION: 0
VISUAL CHANGE: 0

## 2018-10-05 ENCOUNTER — OFFICE VISIT (OUTPATIENT)
Dept: PRIMARY CARE CLINIC | Age: 53
End: 2018-10-05
Payer: COMMERCIAL

## 2018-10-05 VITALS
RESPIRATION RATE: 16 BRPM | SYSTOLIC BLOOD PRESSURE: 112 MMHG | HEIGHT: 71 IN | WEIGHT: 210 LBS | DIASTOLIC BLOOD PRESSURE: 68 MMHG | HEART RATE: 84 BPM | BODY MASS INDEX: 29.4 KG/M2 | TEMPERATURE: 97.5 F | OXYGEN SATURATION: 92 %

## 2018-10-05 DIAGNOSIS — Z00.00 PREVENTATIVE HEALTH CARE: Primary | ICD-10-CM

## 2018-10-05 DIAGNOSIS — E03.9 HYPOTHYROIDISM, UNSPECIFIED TYPE: ICD-10-CM

## 2018-10-05 DIAGNOSIS — Z00.00 PREVENTATIVE HEALTH CARE: ICD-10-CM

## 2018-10-05 DIAGNOSIS — E53.8 VITAMIN B 12 DEFICIENCY: ICD-10-CM

## 2018-10-05 LAB
ALBUMIN SERPL-MCNC: 4.6 G/DL (ref 3.9–4.9)
ALP BLD-CCNC: 103 U/L (ref 35–104)
ALT SERPL-CCNC: 51 U/L (ref 0–41)
ANION GAP SERPL CALCULATED.3IONS-SCNC: 17 MEQ/L (ref 7–13)
AST SERPL-CCNC: 32 U/L (ref 0–40)
BASOPHILS ABSOLUTE: 0.1 K/UL (ref 0–0.2)
BASOPHILS RELATIVE PERCENT: 1.2 %
BILIRUB SERPL-MCNC: 0.5 MG/DL (ref 0–1.2)
BUN BLDV-MCNC: 17 MG/DL (ref 6–20)
CALCIUM SERPL-MCNC: 9.1 MG/DL (ref 8.6–10.2)
CHLORIDE BLD-SCNC: 102 MEQ/L (ref 98–107)
CHOLESTEROL, TOTAL: 195 MG/DL (ref 0–199)
CO2: 23 MEQ/L (ref 22–29)
CREAT SERPL-MCNC: 0.86 MG/DL (ref 0.7–1.2)
EOSINOPHILS ABSOLUTE: 0.2 K/UL (ref 0–0.7)
EOSINOPHILS RELATIVE PERCENT: 3.5 %
GFR AFRICAN AMERICAN: >60
GFR NON-AFRICAN AMERICAN: >60
GLOBULIN: 2.8 G/DL (ref 2.3–3.5)
GLUCOSE BLD-MCNC: 163 MG/DL (ref 74–109)
HBA1C MFR BLD: 8.2 % (ref 4.8–5.9)
HCT VFR BLD CALC: 48.8 % (ref 42–52)
HDLC SERPL-MCNC: 44 MG/DL (ref 40–59)
HEMOGLOBIN: 16.6 G/DL (ref 14–18)
LDL CHOLESTEROL CALCULATED: 127 MG/DL (ref 0–129)
LYMPHOCYTES ABSOLUTE: 1.7 K/UL (ref 1–4.8)
LYMPHOCYTES RELATIVE PERCENT: 33.2 %
MCH RBC QN AUTO: 32.9 PG (ref 27–31.3)
MCHC RBC AUTO-ENTMCNC: 34.1 % (ref 33–37)
MCV RBC AUTO: 96.5 FL (ref 80–100)
MONOCYTES ABSOLUTE: 0.4 K/UL (ref 0.2–0.8)
MONOCYTES RELATIVE PERCENT: 6.7 %
NEUTROPHILS ABSOLUTE: 2.9 K/UL (ref 1.4–6.5)
NEUTROPHILS RELATIVE PERCENT: 55.4 %
PDW BLD-RTO: 13.3 % (ref 11.5–14.5)
PLATELET # BLD: 217 K/UL (ref 130–400)
POTASSIUM SERPL-SCNC: 4.4 MEQ/L (ref 3.5–5.1)
PROSTATE SPECIFIC ANTIGEN: 0.94 NG/ML (ref 0–3.89)
RBC # BLD: 5.06 M/UL (ref 4.7–6.1)
SODIUM BLD-SCNC: 142 MEQ/L (ref 132–144)
TOTAL PROTEIN: 7.4 G/DL (ref 6.4–8.1)
TRIGL SERPL-MCNC: 121 MG/DL (ref 0–200)
TSH SERPL DL<=0.05 MIU/L-ACNC: 1.26 UIU/ML (ref 0.27–4.2)
VITAMIN B-12: 591 PG/ML (ref 232–1245)
WBC # BLD: 5.3 K/UL (ref 4.8–10.8)

## 2018-10-05 PROCEDURE — 99396 PREV VISIT EST AGE 40-64: CPT | Performed by: INTERNAL MEDICINE

## 2018-10-05 ASSESSMENT — PATIENT HEALTH QUESTIONNAIRE - PHQ9
SUM OF ALL RESPONSES TO PHQ QUESTIONS 1-9: 0
SUM OF ALL RESPONSES TO PHQ9 QUESTIONS 1 & 2: 0
2. FEELING DOWN, DEPRESSED OR HOPELESS: 0
1. LITTLE INTEREST OR PLEASURE IN DOING THINGS: 0
SUM OF ALL RESPONSES TO PHQ QUESTIONS 1-9: 0

## 2018-10-05 NOTE — PROGRESS NOTES
Darinel Patino 52 y.o. male presents today with   Chief Complaint   Patient presents with    Annual Exam     Patient is here for a physical for insurance. Currently fasting and in inned for paperwork signed. HPI annual physical    Past Medical History:   Diagnosis Date    Chest pain     stress test neg    Dyslipidemia 1/28/2017    ETOH abuse     quit, he is a counsular, [address] [facility],[address] alcoholic in remission ([facility] [address] 1/28/2017    Vicodin use disorder, mild ([facility] [address]     quit     Patient Active Problem List    Diagnosis Date Noted    Hyperglycemia 12/18/2017    DM type 2 with diabetic dyslipidemia ([facility] [address] 12/18/2017    Recovering alcoholic in remission ([facility] [address] 01/28/2017    Dyslipidemia 01/28/2017    RAD (reactive airway disease) 01/24/2017    Insomnia 01/04/2017     Past Surgical History:   Procedure Laterality Date    APPENDECTOMY  1982    CARDIAC CATHETERIZATION  2007    SHOULDER SURGERY      left 2002 right 2010     No family history on file. Social History     Social History    Marital status: Single     Spouse name: N/A    Number of children: N/A    Years of education: N/A     Social History Main Topics    Smoking status: Never Smoker    Smokeless tobacco: Never Used    Alcohol use No      Comment: quit 2008    Drug use: No    Sexual activity: Yes     Other Topics Concern    None     Social History Narrative    None     Allergies   Allergen Reactions    Seasonal        Review of Systems   Constitutional: Negative for fatigue and fever. HENT: Negative for trouble swallowing and voice change. Eyes: Negative for photophobia and visual disturbance. Respiratory: Negative for choking and shortness of breath. Cardiovascular: Negative for chest pain and palpitations. Gastrointestinal: Negative for nausea and vomiting. Genitourinary: Negative for decreased urine volume, testicular pain and urgency. Skin: Negative for rash. Neurological: Negative for tremors and syncope.

## 2018-10-09 ASSESSMENT — ENCOUNTER SYMPTOMS
TROUBLE SWALLOWING: 0
VOMITING: 0
VOICE CHANGE: 0
PHOTOPHOBIA: 0
SHORTNESS OF BREATH: 0
CHOKING: 0
NAUSEA: 0

## 2018-10-18 DIAGNOSIS — E78.00 PURE HYPERCHOLESTEROLEMIA: ICD-10-CM

## 2018-10-18 DIAGNOSIS — E11.9 CONTROLLED TYPE 2 DIABETES MELLITUS WITHOUT COMPLICATION, WITHOUT LONG-TERM CURRENT USE OF INSULIN (HCC): Primary | ICD-10-CM

## 2018-10-18 DIAGNOSIS — E11.9 NEWLY DIAGNOSED DIABETES (HCC): ICD-10-CM

## 2018-10-18 RX ORDER — ATORVASTATIN CALCIUM 10 MG/1
10 TABLET, FILM COATED ORAL DAILY
Qty: 30 TABLET | Refills: 5 | Status: SHIPPED | OUTPATIENT
Start: 2018-10-18 | End: 2019-11-20 | Stop reason: SDUPTHER

## 2018-10-19 ENCOUNTER — OFFICE VISIT (OUTPATIENT)
Dept: PRIMARY CARE CLINIC | Age: 53
End: 2018-10-19
Payer: COMMERCIAL

## 2018-10-19 VITALS
BODY MASS INDEX: 29.82 KG/M2 | WEIGHT: 213 LBS | SYSTOLIC BLOOD PRESSURE: 108 MMHG | RESPIRATION RATE: 16 BRPM | OXYGEN SATURATION: 97 % | HEART RATE: 75 BPM | TEMPERATURE: 97.5 F | DIASTOLIC BLOOD PRESSURE: 68 MMHG | HEIGHT: 71 IN

## 2018-10-19 DIAGNOSIS — R73.9 HYPERGLYCEMIA: Primary | ICD-10-CM

## 2018-10-19 PROCEDURE — 99213 OFFICE O/P EST LOW 20 MIN: CPT | Performed by: INTERNAL MEDICINE

## 2018-10-19 NOTE — PROGRESS NOTES
Lety Ulrich 52 y.o. male presents today with   Chief Complaint   Patient presents with    Hyperglycemia     Patient is here today to follow up on labs he had done on 10-5-18. Patient's A1C was 8.2       HPI check glucose    Past Medical History:   Diagnosis Date    Chest pain     stress test neg    Dyslipidemia 1/28/2017    ETOH abuse     quit, he is a counsular, [address] [place] [address],[address] alcoholic in remission ([facility] [address] 1/28/2017    Vicodin use disorder, mild ([facility] [address]     quit     Patient Active Problem List    Diagnosis Date Noted    Hyperglycemia 12/18/2017    DM type 2 with diabetic dyslipidemia ([facility] [address] 12/18/2017    Recovering alcoholic in remission ([facility] [address] 01/28/2017    Dyslipidemia 01/28/2017    RAD (reactive airway disease) 01/24/2017    Insomnia 01/04/2017     Past Surgical History:   Procedure Laterality Date    APPENDECTOMY  1982    CARDIAC CATHETERIZATION  2007    SHOULDER SURGERY      left 2002 right 2010     No family history on file. Social History     Social History    Marital status: Single     Spouse name: N/A    Number of children: N/A    Years of education: N/A     Social History Main Topics    Smoking status: Never Smoker    Smokeless tobacco: Never Used    Alcohol use No      Comment: quit 2008    Drug use: No    Sexual activity: Yes     Other Topics Concern    None     Social History Narrative    None     Allergies   Allergen Reactions    Seasonal        Review of Systems   Constitutional: Negative for fatigue and fever. HENT: Negative for trouble swallowing and voice change. Eyes: Negative for photophobia and visual disturbance. Respiratory: Negative for choking and shortness of breath. Cardiovascular: Negative for chest pain and palpitations. Gastrointestinal: Negative for nausea and vomiting. Genitourinary: Negative for decreased urine volume, testicular pain and urgency. Skin: Negative for rash. Neurological: Negative for tremors and syncope.

## 2018-10-23 ASSESSMENT — ENCOUNTER SYMPTOMS
PHOTOPHOBIA: 0
TROUBLE SWALLOWING: 0
VOMITING: 0
VOICE CHANGE: 0
NAUSEA: 0
SHORTNESS OF BREATH: 0
CHOKING: 0

## 2018-12-14 ENCOUNTER — TELEPHONE (OUTPATIENT)
Dept: PRIMARY CARE CLINIC | Age: 53
End: 2018-12-14

## 2018-12-14 DIAGNOSIS — J31.0 CHRONIC RHINITIS: Primary | ICD-10-CM

## 2018-12-14 DIAGNOSIS — E55.9 VITAMIN D DEFICIENCY: ICD-10-CM

## 2018-12-14 RX ORDER — FLUTICASONE PROPIONATE 50 MCG
2 SPRAY, SUSPENSION (ML) NASAL DAILY
Qty: 1 BOTTLE | Refills: 5 | Status: SHIPPED | OUTPATIENT
Start: 2018-12-14 | End: 2019-12-03 | Stop reason: ALTCHOICE

## 2018-12-14 RX ORDER — MULTIVIT-MIN/IRON/FOLIC ACID/K 18-600-40
1 CAPSULE ORAL DAILY
Qty: 30 CAPSULE | Refills: 2 | Status: SHIPPED | OUTPATIENT
Start: 2018-12-14 | End: 2020-10-13 | Stop reason: SDUPTHER

## 2019-01-19 DIAGNOSIS — E11.9 CONTROLLED TYPE 2 DIABETES MELLITUS WITHOUT COMPLICATION, WITHOUT LONG-TERM CURRENT USE OF INSULIN (HCC): ICD-10-CM

## 2019-01-19 LAB
ALBUMIN SERPL-MCNC: 4.7 G/DL (ref 3.9–4.9)
ALP BLD-CCNC: 93 U/L (ref 35–104)
ALT SERPL-CCNC: 49 U/L (ref 0–41)
ANION GAP SERPL CALCULATED.3IONS-SCNC: 12 MEQ/L (ref 7–13)
AST SERPL-CCNC: 29 U/L (ref 0–40)
BILIRUB SERPL-MCNC: 0.5 MG/DL (ref 0–1.2)
BUN BLDV-MCNC: 16 MG/DL (ref 6–20)
CALCIUM SERPL-MCNC: 9.1 MG/DL (ref 8.6–10.2)
CHLORIDE BLD-SCNC: 103 MEQ/L (ref 98–107)
CO2: 26 MEQ/L (ref 22–29)
CREAT SERPL-MCNC: 1.06 MG/DL (ref 0.7–1.2)
GFR AFRICAN AMERICAN: >60
GFR NON-AFRICAN AMERICAN: >60
GLOBULIN: 2.4 G/DL (ref 2.3–3.5)
GLUCOSE BLD-MCNC: 158 MG/DL (ref 74–109)
HBA1C MFR BLD: 8.2 % (ref 4.8–5.9)
POTASSIUM SERPL-SCNC: 3.9 MEQ/L (ref 3.5–5.1)
SODIUM BLD-SCNC: 141 MEQ/L (ref 132–144)
TOTAL PROTEIN: 7.1 G/DL (ref 6.4–8.1)

## 2019-02-06 ENCOUNTER — OFFICE VISIT (OUTPATIENT)
Dept: CARDIOLOGY CLINIC | Age: 54
End: 2019-02-06
Payer: COMMERCIAL

## 2019-02-06 ENCOUNTER — OFFICE VISIT (OUTPATIENT)
Dept: PRIMARY CARE CLINIC | Age: 54
End: 2019-02-06
Payer: COMMERCIAL

## 2019-02-06 VITALS
SYSTOLIC BLOOD PRESSURE: 118 MMHG | HEIGHT: 70 IN | BODY MASS INDEX: 30.64 KG/M2 | WEIGHT: 214 LBS | RESPIRATION RATE: 18 BRPM | HEART RATE: 94 BPM | DIASTOLIC BLOOD PRESSURE: 80 MMHG

## 2019-02-06 VITALS
HEIGHT: 70 IN | HEART RATE: 94 BPM | TEMPERATURE: 98.2 F | WEIGHT: 214.6 LBS | SYSTOLIC BLOOD PRESSURE: 118 MMHG | DIASTOLIC BLOOD PRESSURE: 80 MMHG | OXYGEN SATURATION: 98 % | BODY MASS INDEX: 30.72 KG/M2

## 2019-02-06 DIAGNOSIS — E78.5 DM TYPE 2 WITH DIABETIC DYSLIPIDEMIA (HCC): ICD-10-CM

## 2019-02-06 DIAGNOSIS — I49.3 PVC (PREMATURE VENTRICULAR CONTRACTION): ICD-10-CM

## 2019-02-06 DIAGNOSIS — F10.21 RECOVERING ALCOHOLIC IN REMISSION (HCC): ICD-10-CM

## 2019-02-06 DIAGNOSIS — R07.9 CHEST PAIN, UNSPECIFIED TYPE: Primary | ICD-10-CM

## 2019-02-06 DIAGNOSIS — R07.89 OTHER CHEST PAIN: Primary | ICD-10-CM

## 2019-02-06 DIAGNOSIS — R94.31 ABNORMAL FINDING ON EKG: ICD-10-CM

## 2019-02-06 DIAGNOSIS — E11.69 DM TYPE 2 WITH DIABETIC DYSLIPIDEMIA (HCC): ICD-10-CM

## 2019-02-06 PROCEDURE — 99203 OFFICE O/P NEW LOW 30 MIN: CPT | Performed by: INTERNAL MEDICINE

## 2019-02-06 PROCEDURE — 99214 OFFICE O/P EST MOD 30 MIN: CPT | Performed by: INTERNAL MEDICINE

## 2019-02-06 PROCEDURE — 93000 ELECTROCARDIOGRAM COMPLETE: CPT | Performed by: INTERNAL MEDICINE

## 2019-02-06 RX ORDER — LISINOPRIL 5 MG/1
5 TABLET ORAL DAILY
Qty: 90 TABLET | Refills: 3 | Status: SHIPPED | OUTPATIENT
Start: 2019-02-06 | End: 2019-12-03

## 2019-02-06 ASSESSMENT — ENCOUNTER SYMPTOMS
DIARRHEA: 0
VOMITING: 0
VOICE CHANGE: 0
ANAL BLEEDING: 0
NAUSEA: 0
FACIAL SWELLING: 0
WHEEZING: 0
BLOOD IN STOOL: 0
COLOR CHANGE: 0
ABDOMINAL DISTENTION: 0
APNEA: 0
TROUBLE SWALLOWING: 0
SHORTNESS OF BREATH: 0
CHEST TIGHTNESS: 0

## 2019-02-06 ASSESSMENT — PATIENT HEALTH QUESTIONNAIRE - PHQ9
1. LITTLE INTEREST OR PLEASURE IN DOING THINGS: 0
SUM OF ALL RESPONSES TO PHQ QUESTIONS 1-9: 0
SUM OF ALL RESPONSES TO PHQ QUESTIONS 1-9: 0
SUM OF ALL RESPONSES TO PHQ9 QUESTIONS 1 & 2: 0
2. FEELING DOWN, DEPRESSED OR HOPELESS: 0

## 2019-02-09 ASSESSMENT — ENCOUNTER SYMPTOMS
VOMITING: 0
CHOKING: 0
VOICE CHANGE: 0
PHOTOPHOBIA: 0
TROUBLE SWALLOWING: 0
NAUSEA: 0
SHORTNESS OF BREATH: 0

## 2019-02-18 ENCOUNTER — HOSPITAL ENCOUNTER (OUTPATIENT)
Dept: NON INVASIVE DIAGNOSTICS | Age: 54
Discharge: HOME | End: 2019-02-18
Payer: COMMERCIAL

## 2019-02-18 ENCOUNTER — APPOINTMENT (OUTPATIENT)
Dept: NON INVASIVE DIAGNOSTICS | Age: 54
End: 2019-02-18
Payer: COMMERCIAL

## 2019-02-18 DIAGNOSIS — R07.89 OTHER CHEST PAIN: ICD-10-CM

## 2019-02-18 DIAGNOSIS — I49.3 PVC (PREMATURE VENTRICULAR CONTRACTION): ICD-10-CM

## 2019-02-18 DIAGNOSIS — R94.31 ABNORMAL FINDING ON EKG: ICD-10-CM

## 2019-02-18 LAB
LV EF: 60 %
LVEF MODALITY: NORMAL

## 2019-02-18 PROCEDURE — 93306 TTE W/DOPPLER COMPLETE: CPT

## 2019-10-09 ENCOUNTER — OFFICE VISIT (OUTPATIENT)
Dept: FAMILY MEDICINE CLINIC | Age: 54
End: 2019-10-09
Payer: COMMERCIAL

## 2019-10-09 ENCOUNTER — TELEPHONE (OUTPATIENT)
Dept: ENDOSCOPY | Age: 54
End: 2019-10-09

## 2019-10-09 VITALS
HEART RATE: 80 BPM | WEIGHT: 212 LBS | DIASTOLIC BLOOD PRESSURE: 80 MMHG | OXYGEN SATURATION: 94 % | SYSTOLIC BLOOD PRESSURE: 122 MMHG | TEMPERATURE: 98.2 F | RESPIRATION RATE: 14 BRPM | BODY MASS INDEX: 30.35 KG/M2 | HEIGHT: 70 IN

## 2019-10-09 DIAGNOSIS — E34.9 TESTOSTERONE DEFICIENCY: ICD-10-CM

## 2019-10-09 DIAGNOSIS — Z00.00 PREVENTATIVE HEALTH CARE: ICD-10-CM

## 2019-10-09 DIAGNOSIS — E11.69 DM TYPE 2 WITH DIABETIC DYSLIPIDEMIA (HCC): ICD-10-CM

## 2019-10-09 DIAGNOSIS — E78.5 DM TYPE 2 WITH DIABETIC DYSLIPIDEMIA (HCC): ICD-10-CM

## 2019-10-09 DIAGNOSIS — Z11.59 ENCOUNTER FOR HEPATITIS C SCREENING TEST FOR LOW RISK PATIENT: ICD-10-CM

## 2019-10-09 DIAGNOSIS — Z12.5 PROSTATE CANCER SCREENING: ICD-10-CM

## 2019-10-09 DIAGNOSIS — E78.00 PURE HYPERCHOLESTEROLEMIA: ICD-10-CM

## 2019-10-09 DIAGNOSIS — Z12.11 COLON CANCER SCREENING: ICD-10-CM

## 2019-10-09 DIAGNOSIS — G47.00 INSOMNIA, UNSPECIFIED TYPE: ICD-10-CM

## 2019-10-09 DIAGNOSIS — R07.89 OTHER CHEST PAIN: ICD-10-CM

## 2019-10-09 DIAGNOSIS — F41.9 ANXIETY: ICD-10-CM

## 2019-10-09 DIAGNOSIS — R07.89 OTHER CHEST PAIN: Primary | ICD-10-CM

## 2019-10-09 LAB
ALBUMIN SERPL-MCNC: 4.6 G/DL (ref 3.5–4.6)
ALP BLD-CCNC: 99 U/L (ref 35–104)
ALT SERPL-CCNC: 71 U/L (ref 0–41)
ANION GAP SERPL CALCULATED.3IONS-SCNC: 14 MEQ/L (ref 9–15)
AST SERPL-CCNC: 45 U/L (ref 0–40)
BASOPHILS ABSOLUTE: 0.1 K/UL (ref 0–0.2)
BASOPHILS RELATIVE PERCENT: 1.2 %
BILIRUB SERPL-MCNC: 0.4 MG/DL (ref 0.2–0.7)
BUN BLDV-MCNC: 13 MG/DL (ref 6–20)
CALCIUM SERPL-MCNC: 9.2 MG/DL (ref 8.5–9.9)
CHLORIDE BLD-SCNC: 103 MEQ/L (ref 95–107)
CHOLESTEROL, TOTAL: 212 MG/DL (ref 0–199)
CO2: 25 MEQ/L (ref 20–31)
CREAT SERPL-MCNC: 0.87 MG/DL (ref 0.7–1.2)
EOSINOPHILS ABSOLUTE: 0.2 K/UL (ref 0–0.7)
EOSINOPHILS RELATIVE PERCENT: 3.9 %
GFR AFRICAN AMERICAN: >60
GFR NON-AFRICAN AMERICAN: >60
GLOBULIN: 3 G/DL (ref 2.3–3.5)
GLUCOSE BLD-MCNC: 120 MG/DL (ref 70–99)
HBA1C MFR BLD: 8.2 % (ref 4.8–5.9)
HCT VFR BLD CALC: 49.6 % (ref 42–52)
HDLC SERPL-MCNC: 42 MG/DL (ref 40–59)
HEMOGLOBIN: 16 G/DL (ref 14–18)
HEPATITIS C ANTIBODY INTERPRETATION: NORMAL
LDL CHOLESTEROL CALCULATED: 142 MG/DL (ref 0–129)
LYMPHOCYTES ABSOLUTE: 1.8 K/UL (ref 1–4.8)
LYMPHOCYTES RELATIVE PERCENT: 31 %
MCH RBC QN AUTO: 31.6 PG (ref 27–31.3)
MCHC RBC AUTO-ENTMCNC: 32.4 % (ref 33–37)
MCV RBC AUTO: 97.6 FL (ref 80–100)
MONOCYTES ABSOLUTE: 0.5 K/UL (ref 0.2–0.8)
MONOCYTES RELATIVE PERCENT: 7.7 %
NEUTROPHILS ABSOLUTE: 3.3 K/UL (ref 1.4–6.5)
NEUTROPHILS RELATIVE PERCENT: 56.2 %
PDW BLD-RTO: 13.8 % (ref 11.5–14.5)
PLATELET # BLD: 248 K/UL (ref 130–400)
POTASSIUM SERPL-SCNC: 4.5 MEQ/L (ref 3.4–4.9)
PROSTATE SPECIFIC ANTIGEN: 0.77 NG/ML (ref 0–3.89)
RBC # BLD: 5.08 M/UL (ref 4.7–6.1)
SODIUM BLD-SCNC: 142 MEQ/L (ref 135–144)
TOTAL PROTEIN: 7.6 G/DL (ref 6.3–8)
TRIGL SERPL-MCNC: 138 MG/DL (ref 0–150)
WBC # BLD: 5.9 K/UL (ref 4.8–10.8)

## 2019-10-09 PROCEDURE — 99214 OFFICE O/P EST MOD 30 MIN: CPT | Performed by: INTERNAL MEDICINE

## 2019-10-09 RX ORDER — HYDROXYZINE HYDROCHLORIDE 25 MG/1
TABLET, FILM COATED ORAL
Qty: 90 TABLET | Refills: 1 | Status: SHIPPED | OUTPATIENT
Start: 2019-10-09 | End: 2020-10-13

## 2019-10-12 LAB
SEX HORMONE BINDING GLOBULIN: 34 NMOL/L (ref 11–80)
TESTOSTERONE FREE PERCENT: 1.8 % (ref 1.6–2.9)
TESTOSTERONE FREE, CALC: 68 PG/ML (ref 47–244)
TESTOSTERONE TOTAL-MALE: 375 NG/DL (ref 300–890)

## 2019-10-16 ASSESSMENT — ENCOUNTER SYMPTOMS
BLURRED VISION: 0
NAUSEA: 0
VOMITING: 0
WHEEZING: 0
CHOKING: 0
VOICE CHANGE: 0
TROUBLE SWALLOWING: 0
SHORTNESS OF BREATH: 0
PHOTOPHOBIA: 0

## 2019-10-24 DIAGNOSIS — Z12.11 COLON CANCER SCREENING: Primary | ICD-10-CM

## 2019-11-07 ENCOUNTER — ANESTHESIA EVENT (OUTPATIENT)
Dept: ENDOSCOPY | Age: 54
End: 2019-11-07
Payer: COMMERCIAL

## 2019-11-08 ENCOUNTER — HOSPITAL ENCOUNTER (OUTPATIENT)
Age: 54
Setting detail: OUTPATIENT SURGERY
Discharge: HOME | End: 2019-11-08
Attending: INTERNAL MEDICINE | Admitting: INTERNAL MEDICINE
Payer: COMMERCIAL

## 2019-11-08 ENCOUNTER — ANESTHESIA (OUTPATIENT)
Dept: ENDOSCOPY | Age: 54
End: 2019-11-08
Payer: COMMERCIAL

## 2019-11-08 VITALS
TEMPERATURE: 97.8 F | OXYGEN SATURATION: 96 % | HEIGHT: 70 IN | WEIGHT: 200 LBS | SYSTOLIC BLOOD PRESSURE: 111 MMHG | RESPIRATION RATE: 20 BRPM | DIASTOLIC BLOOD PRESSURE: 73 MMHG | HEART RATE: 82 BPM | BODY MASS INDEX: 28.63 KG/M2

## 2019-11-08 VITALS
RESPIRATION RATE: 20 BRPM | DIASTOLIC BLOOD PRESSURE: 56 MMHG | OXYGEN SATURATION: 96 % | SYSTOLIC BLOOD PRESSURE: 92 MMHG

## 2019-11-08 PROCEDURE — 2500000003 HC RX 250 WO HCPCS: Performed by: NURSE ANESTHETIST, CERTIFIED REGISTERED

## 2019-11-08 PROCEDURE — 45385 COLONOSCOPY W/LESION REMOVAL: CPT | Performed by: INTERNAL MEDICINE

## 2019-11-08 PROCEDURE — 3609027000 HC COLONOSCOPY: Performed by: INTERNAL MEDICINE

## 2019-11-08 PROCEDURE — 45380 COLONOSCOPY AND BIOPSY: CPT | Performed by: INTERNAL MEDICINE

## 2019-11-08 PROCEDURE — 88305 TISSUE EXAM BY PATHOLOGIST: CPT

## 2019-11-08 PROCEDURE — 7100000010 HC PHASE II RECOVERY - FIRST 15 MIN: Performed by: INTERNAL MEDICINE

## 2019-11-08 PROCEDURE — 3700000001 HC ADD 15 MINUTES (ANESTHESIA): Performed by: INTERNAL MEDICINE

## 2019-11-08 PROCEDURE — 7100000011 HC PHASE II RECOVERY - ADDTL 15 MIN: Performed by: INTERNAL MEDICINE

## 2019-11-08 PROCEDURE — 2580000003 HC RX 258: Performed by: INTERNAL MEDICINE

## 2019-11-08 PROCEDURE — 6360000002 HC RX W HCPCS: Performed by: NURSE ANESTHETIST, CERTIFIED REGISTERED

## 2019-11-08 PROCEDURE — 3700000000 HC ANESTHESIA ATTENDED CARE: Performed by: INTERNAL MEDICINE

## 2019-11-08 RX ORDER — LIDOCAINE HYDROCHLORIDE 20 MG/ML
INJECTION, SOLUTION INFILTRATION; PERINEURAL PRN
Status: DISCONTINUED | OUTPATIENT
Start: 2019-11-08 | End: 2019-11-08 | Stop reason: SDUPTHER

## 2019-11-08 RX ORDER — PROPOFOL 10 MG/ML
INJECTION, EMULSION INTRAVENOUS PRN
Status: DISCONTINUED | OUTPATIENT
Start: 2019-11-08 | End: 2019-11-08 | Stop reason: SDUPTHER

## 2019-11-08 RX ORDER — SODIUM CHLORIDE 0.9 % (FLUSH) 0.9 %
10 SYRINGE (ML) INJECTION EVERY 12 HOURS SCHEDULED
Status: DISCONTINUED | OUTPATIENT
Start: 2019-11-08 | End: 2019-11-08 | Stop reason: HOSPADM

## 2019-11-08 RX ORDER — ASPIRIN 81 MG/1
81 TABLET, CHEWABLE ORAL DAILY
COMMUNITY
End: 2020-10-13 | Stop reason: SDUPTHER

## 2019-11-08 RX ORDER — SODIUM CHLORIDE 0.9 % (FLUSH) 0.9 %
10 SYRINGE (ML) INJECTION PRN
Status: DISCONTINUED | OUTPATIENT
Start: 2019-11-08 | End: 2019-11-08 | Stop reason: HOSPADM

## 2019-11-08 RX ORDER — SODIUM CHLORIDE 9 MG/ML
INJECTION, SOLUTION INTRAVENOUS CONTINUOUS
Status: DISCONTINUED | OUTPATIENT
Start: 2019-11-08 | End: 2019-11-08 | Stop reason: HOSPADM

## 2019-11-08 RX ORDER — LIDOCAINE HYDROCHLORIDE 10 MG/ML
1 INJECTION, SOLUTION EPIDURAL; INFILTRATION; INTRACAUDAL; PERINEURAL
Status: DISCONTINUED | OUTPATIENT
Start: 2019-11-08 | End: 2019-11-08 | Stop reason: HOSPADM

## 2019-11-08 RX ADMIN — SODIUM CHLORIDE: 9 INJECTION, SOLUTION INTRAVENOUS at 10:51

## 2019-11-08 RX ADMIN — LIDOCAINE HYDROCHLORIDE 50 MG: 20 INJECTION, SOLUTION INFILTRATION; PERINEURAL at 11:38

## 2019-11-08 RX ADMIN — PROPOFOL 360 MG: 10 INJECTION, EMULSION INTRAVENOUS at 11:38

## 2019-11-08 ASSESSMENT — PAIN - FUNCTIONAL ASSESSMENT: PAIN_FUNCTIONAL_ASSESSMENT: 0-10

## 2019-11-18 ENCOUNTER — TELEPHONE (OUTPATIENT)
Dept: GASTROENTEROLOGY | Age: 54
End: 2019-11-18

## 2019-11-21 ENCOUNTER — CLINICAL DOCUMENTATION (OUTPATIENT)
Dept: PRIMARY CARE CLINIC | Age: 54
End: 2019-11-21

## 2019-11-21 RX ORDER — ATORVASTATIN CALCIUM 10 MG/1
10 TABLET, FILM COATED ORAL DAILY
Qty: 30 TABLET | Refills: 5 | Status: SHIPPED | OUTPATIENT
Start: 2019-11-21 | End: 2019-12-03 | Stop reason: SDUPTHER

## 2019-11-25 RX ORDER — ALBUTEROL SULFATE 2.5 MG/3ML
2.5 SOLUTION RESPIRATORY (INHALATION) EVERY 4 HOURS PRN
Qty: 100 VIAL | Refills: 3 | Status: SHIPPED | OUTPATIENT
Start: 2019-11-25 | End: 2020-10-13 | Stop reason: SDUPTHER

## 2019-12-02 RX ORDER — ALBUTEROL SULFATE 90 UG/1
2 AEROSOL, METERED RESPIRATORY (INHALATION) EVERY 6 HOURS PRN
Qty: 1 INHALER | Refills: 5 | Status: SHIPPED | OUTPATIENT
Start: 2019-12-02 | End: 2020-10-13 | Stop reason: SDUPTHER

## 2019-12-03 ENCOUNTER — OFFICE VISIT (OUTPATIENT)
Dept: CARDIOLOGY CLINIC | Age: 54
End: 2019-12-03
Payer: COMMERCIAL

## 2019-12-03 VITALS
HEART RATE: 86 BPM | RESPIRATION RATE: 16 BRPM | DIASTOLIC BLOOD PRESSURE: 78 MMHG | SYSTOLIC BLOOD PRESSURE: 114 MMHG | OXYGEN SATURATION: 98 % | BODY MASS INDEX: 31.02 KG/M2 | WEIGHT: 216.2 LBS

## 2019-12-03 DIAGNOSIS — E78.5 DYSLIPIDEMIA: ICD-10-CM

## 2019-12-03 DIAGNOSIS — R07.89 OTHER CHEST PAIN: Primary | ICD-10-CM

## 2019-12-03 DIAGNOSIS — E66.09 CLASS 1 OBESITY DUE TO EXCESS CALORIES WITHOUT SERIOUS COMORBIDITY WITH BODY MASS INDEX (BMI) OF 31.0 TO 31.9 IN ADULT: ICD-10-CM

## 2019-12-03 DIAGNOSIS — R94.31 ABNORMAL FINDING ON EKG: ICD-10-CM

## 2019-12-03 PROCEDURE — 99204 OFFICE O/P NEW MOD 45 MIN: CPT | Performed by: INTERNAL MEDICINE

## 2019-12-03 PROCEDURE — 93000 ELECTROCARDIOGRAM COMPLETE: CPT | Performed by: INTERNAL MEDICINE

## 2019-12-26 RX ORDER — ROSUVASTATIN CALCIUM 5 MG/1
5 TABLET, COATED ORAL NIGHTLY
Qty: 30 TABLET | Refills: 5 | Status: SHIPPED | OUTPATIENT
Start: 2019-12-26 | End: 2020-05-31

## 2020-01-07 ENCOUNTER — TELEPHONE (OUTPATIENT)
Dept: CARDIOLOGY CLINIC | Age: 55
End: 2020-01-07

## 2020-01-07 RX ORDER — ATENOLOL 100 MG/1
TABLET ORAL
Qty: 1 TABLET | Refills: 0 | Status: SHIPPED | OUTPATIENT
Start: 2020-01-07 | End: 2020-05-31

## 2020-03-06 RX ORDER — ATORVASTATIN CALCIUM 10 MG/1
TABLET, FILM COATED ORAL
Qty: 60 TABLET | Refills: 0 | Status: SHIPPED | OUTPATIENT
Start: 2020-03-06 | End: 2020-10-13 | Stop reason: SDUPTHER

## 2020-03-19 ENCOUNTER — OFFICE VISIT (OUTPATIENT)
Dept: PRIMARY CARE CLINIC | Age: 55
End: 2020-03-19
Payer: COMMERCIAL

## 2020-03-19 VITALS
WEIGHT: 213 LBS | OXYGEN SATURATION: 97 % | DIASTOLIC BLOOD PRESSURE: 72 MMHG | TEMPERATURE: 98 F | HEIGHT: 70 IN | SYSTOLIC BLOOD PRESSURE: 128 MMHG | HEART RATE: 86 BPM | BODY MASS INDEX: 30.49 KG/M2

## 2020-03-19 DIAGNOSIS — J02.9 SORE THROAT: ICD-10-CM

## 2020-03-19 LAB
INFLUENZA A ANTIBODY: NEGATIVE
INFLUENZA B ANTIBODY: NEGATIVE
S PYO AG THROAT QL: NORMAL

## 2020-03-19 PROCEDURE — 87804 INFLUENZA ASSAY W/OPTIC: CPT | Performed by: NURSE PRACTITIONER

## 2020-03-19 PROCEDURE — 87880 STREP A ASSAY W/OPTIC: CPT | Performed by: NURSE PRACTITIONER

## 2020-03-19 PROCEDURE — 99213 OFFICE O/P EST LOW 20 MIN: CPT | Performed by: NURSE PRACTITIONER

## 2020-03-19 RX ORDER — AZITHROMYCIN 250 MG/1
250 TABLET, FILM COATED ORAL SEE ADMIN INSTRUCTIONS
Qty: 6 TABLET | Refills: 0 | Status: SHIPPED | OUTPATIENT
Start: 2020-03-19 | End: 2021-02-02 | Stop reason: SDUPTHER

## 2020-03-19 ASSESSMENT — ENCOUNTER SYMPTOMS
TROUBLE SWALLOWING: 0
COLOR CHANGE: 0
VOMITING: 0
CONSTIPATION: 0
COUGH: 1
NAUSEA: 0
SORE THROAT: 1
VOICE CHANGE: 0
BACK PAIN: 0
DIARRHEA: 0
ABDOMINAL PAIN: 0
SHORTNESS OF BREATH: 0

## 2020-03-19 NOTE — PROGRESS NOTES
1/28/2017    ETOH abuse     quit, he is a counsular, [address] Hypertension     MI, old 2007    Other chest pain 2/6/2019    Reactive airway disease     Recovering alcoholic in remission ([facility] [address] 1/28/2017    Vicodin use disorder, mild (HCC)     quit         Assessment/Plan:  1. Sore throat    - POCT rapid strep A  - Culture, Throat; Future    2. Cough    - POCT Influenza A/B    3. Bronchitis    - azithromycin (ZITHROMAX) 250 MG tablet; Take 1 tablet by mouth See Admin Instructions for 5 days 500mg on day 1 followed by 250mg on days 2 - 5  Dispense: 6 tablet; Refill: 0    Negative influenza and negative rapid strep. Throat culture pending. JADIEL Paz - CNP  3/19/20     This visit was provided as a focused evaluation during the COVID -19 pandemic/national emergency. A comprehensive review of all previous patient history and testing was not conducted. Pertinent findings were elicited during the visit.

## 2020-03-19 NOTE — PATIENT INSTRUCTIONS
good.  When should you call for help? Call 911 anytime you think you may need emergency care. For example, call if:    · You have severe trouble breathing.    Call your doctor now or seek immediate medical care if:    · You have new or worse trouble breathing.     · You cough up dark brown or bloody mucus (sputum).     · You have a new or higher fever.     · You have a new rash.    Watch closely for changes in your health, and be sure to contact your doctor if:    · You cough more deeply or more often, especially if you notice more mucus or a change in the color of your mucus.     · You are not getting better as expected. Where can you learn more? Go to https://[employer]. org and sign in to your [employer] account. Enter H333 in the [place] box to learn more about \"Bronchitis: Care Instructions. \"     If you do not have an account, please click on the \"Sign Up Now\" link. Current as of: June 9, 2019Content Version: 12.4  © 1004-1910 [employer]wise, Incorporated. Care instructions adapted under license by [facility] ([facility]). If you have questions about a medical condition or this instruction, always ask your healthcare professional. [address] any warranty or liability for your use of this information. Antibiotics: To prevent antibiotic resistances please take medication as ordered and for the full duration even if you start to feel better. If you are using contraception please use a backup method of contraception such as condoms during antibiotic use and for 7 days after completing treatment to prevent pregnancy. Females: Consider intake of yogurt or probiotic during antibiotic use and for a few days after to help reduce the risk of superinfection (yeast infection). Separate the yogurt and antibiotic by at least 1 hour. Avoid Alcohol.

## 2020-03-19 NOTE — LETTER
[address]  Phone: [phone]  Fax: [phone]    JADIEL Muñoz - FELICE        March 19, 2020     Patient: Mirella Mijares   YOB: 1965   Date of Visit: 3/19/2020       To Whom it May Concern:    Mirella Mijares was seen in my clinic on 3/19/2020. He may return to work on 3/20/2020. If you have any questions or concerns, please don't hesitate to call.     Sincerely,           JADIEL Muñoz - CNP

## 2020-03-22 LAB — THROAT CULTURE: NORMAL

## 2020-05-31 ENCOUNTER — OFFICE VISIT (OUTPATIENT)
Dept: PRIMARY CARE CLINIC | Age: 55
End: 2020-05-31
Payer: COMMERCIAL

## 2020-05-31 VITALS
HEART RATE: 90 BPM | OXYGEN SATURATION: 98 % | DIASTOLIC BLOOD PRESSURE: 78 MMHG | TEMPERATURE: 98.1 F | SYSTOLIC BLOOD PRESSURE: 124 MMHG | RESPIRATION RATE: 15 BRPM

## 2020-05-31 DIAGNOSIS — Z20.822 EXPOSURE TO COVID-19 VIRUS: ICD-10-CM

## 2020-05-31 PROCEDURE — 99213 OFFICE O/P EST LOW 20 MIN: CPT | Performed by: NURSE PRACTITIONER

## 2020-05-31 ASSESSMENT — ENCOUNTER SYMPTOMS
WHEEZING: 0
SHORTNESS OF BREATH: 0
COUGH: 0
DIARRHEA: 0
ABDOMINAL PAIN: 0
CHEST TIGHTNESS: 0
SORE THROAT: 0
NAUSEA: 0

## 2020-05-31 NOTE — PROGRESS NOTES
General: Lids are normal.      Conjunctiva/sclera: Conjunctivae normal.   Neck:      Musculoskeletal: Normal range of motion and neck supple. No neck rigidity or pain with movement. Cardiovascular:      Rate and Rhythm: Normal rate. Pulmonary:      Effort: Pulmonary effort is normal.      Breath sounds: Normal breath sounds and air entry. Musculoskeletal: Normal range of motion. Lymphadenopathy:      Head:      Right side of head: No submental, submandibular, tonsillar, preauricular or posterior auricular adenopathy. Left side of head: No submental, submandibular, tonsillar, preauricular or posterior auricular adenopathy. Cervical: No cervical adenopathy. Skin:     General: Skin is warm and dry. Coloration: Skin is not pale. Neurological:      General: No focal deficit present. Mental Status: He is alert and oriented to person, place, and time. Psychiatric:         Mood and Affect: Mood normal.         Behavior: Behavior normal.           Travel Screening      No screening recorded since 05/30/20 0000      Travel History   Travel since 04/30/20     No documented travel since 04/30/20          Past Medical History:   Diagnosis Date    Abnormal finding on EKG 2/6/2019    Atrial fibrillation (HCC)     Chest pain     stress test neg    Class 1 obesity due to excess calories without serious comorbidity with body mass index (BMI) of 31.0 to 31.9 in adult 12/3/2019    Diabetes mellitus ([facility] [address]     Dyslipidemia 1/28/2017    ETOH abuse     quit, he is a counsular, [address] Hypertension     MI, old 2007    Other chest pain 2/6/2019    Reactive airway disease     Recovering alcoholic in remission ([facility] [address] 1/28/2017    Vicodin use disorder, mild (HCC)     quit         Assessment/Plan:  1. Exposure to COVID-19 virus  - COVID-19 Ambulatory;  Future  -Will review testing result with patient when it is available  -Pt will remain home, including work, until testing results  -Discussed

## 2020-06-03 LAB
SARS-COV-2: NOT DETECTED
SOURCE: NORMAL

## 2020-10-13 ENCOUNTER — OFFICE VISIT (OUTPATIENT)
Dept: PRIMARY CARE CLINIC | Age: 55
End: 2020-10-13
Payer: COMMERCIAL

## 2020-10-13 VITALS
RESPIRATION RATE: 14 BRPM | WEIGHT: 208 LBS | DIASTOLIC BLOOD PRESSURE: 86 MMHG | BODY MASS INDEX: 29.78 KG/M2 | HEIGHT: 70 IN | OXYGEN SATURATION: 97 % | TEMPERATURE: 97 F | HEART RATE: 86 BPM | SYSTOLIC BLOOD PRESSURE: 122 MMHG

## 2020-10-13 DIAGNOSIS — E78.5 DM TYPE 2 WITH DIABETIC DYSLIPIDEMIA (HCC): ICD-10-CM

## 2020-10-13 DIAGNOSIS — E11.69 DM TYPE 2 WITH DIABETIC DYSLIPIDEMIA (HCC): ICD-10-CM

## 2020-10-13 DIAGNOSIS — E78.00 PURE HYPERCHOLESTEROLEMIA: ICD-10-CM

## 2020-10-13 DIAGNOSIS — Z00.00 PREVENTATIVE HEALTH CARE: ICD-10-CM

## 2020-10-13 DIAGNOSIS — Z12.5 PROSTATE CANCER SCREENING: ICD-10-CM

## 2020-10-13 LAB
ALBUMIN SERPL-MCNC: 4.6 G/DL (ref 3.5–4.6)
ALP BLD-CCNC: 85 U/L (ref 35–104)
ALT SERPL-CCNC: 43 U/L (ref 0–41)
ANION GAP SERPL CALCULATED.3IONS-SCNC: 15 MEQ/L (ref 9–15)
AST SERPL-CCNC: 28 U/L (ref 0–40)
BASOPHILS ABSOLUTE: 0.1 K/UL (ref 0–0.2)
BASOPHILS RELATIVE PERCENT: 0.9 %
BILIRUB SERPL-MCNC: 0.3 MG/DL (ref 0.2–0.7)
BUN BLDV-MCNC: 15 MG/DL (ref 6–20)
CALCIUM SERPL-MCNC: 9.2 MG/DL (ref 8.5–9.9)
CHLORIDE BLD-SCNC: 103 MEQ/L (ref 95–107)
CHOLESTEROL, TOTAL: 195 MG/DL (ref 0–199)
CO2: 21 MEQ/L (ref 20–31)
CREAT SERPL-MCNC: 0.89 MG/DL (ref 0.7–1.2)
CREATININE URINE: 297.7 MG/DL
EOSINOPHILS ABSOLUTE: 0.2 K/UL (ref 0–0.7)
EOSINOPHILS RELATIVE PERCENT: 3.6 %
GFR AFRICAN AMERICAN: >60
GFR NON-AFRICAN AMERICAN: >60
GLOBULIN: 2.9 G/DL (ref 2.3–3.5)
GLUCOSE BLD-MCNC: 114 MG/DL (ref 70–99)
HBA1C MFR BLD: 7.6 % (ref 4.8–5.9)
HCT VFR BLD CALC: 45.4 % (ref 42–52)
HDLC SERPL-MCNC: 40 MG/DL (ref 40–59)
HEMOGLOBIN: 15.1 G/DL (ref 14–18)
LDL CHOLESTEROL CALCULATED: 130 MG/DL (ref 0–129)
LYMPHOCYTES ABSOLUTE: 2.1 K/UL (ref 1–4.8)
LYMPHOCYTES RELATIVE PERCENT: 31.6 %
MCH RBC QN AUTO: 31.7 PG (ref 27–31.3)
MCHC RBC AUTO-ENTMCNC: 33.3 % (ref 33–37)
MCV RBC AUTO: 95.1 FL (ref 80–100)
MICROALBUMIN UR-MCNC: 4.7 MG/DL
MICROALBUMIN/CREAT UR-RTO: 15.8 MG/G (ref 0–30)
MONOCYTES ABSOLUTE: 0.4 K/UL (ref 0.2–0.8)
MONOCYTES RELATIVE PERCENT: 6.4 %
NEUTROPHILS ABSOLUTE: 3.8 K/UL (ref 1.4–6.5)
NEUTROPHILS RELATIVE PERCENT: 57.5 %
PDW BLD-RTO: 13.6 % (ref 11.5–14.5)
PLATELET # BLD: 236 K/UL (ref 130–400)
POTASSIUM SERPL-SCNC: 4.2 MEQ/L (ref 3.4–4.9)
PROSTATE SPECIFIC ANTIGEN: 0.69 NG/ML (ref 0–3.89)
RBC # BLD: 4.77 M/UL (ref 4.7–6.1)
SODIUM BLD-SCNC: 139 MEQ/L (ref 135–144)
TOTAL PROTEIN: 7.5 G/DL (ref 6.3–8)
TRIGL SERPL-MCNC: 126 MG/DL (ref 0–150)
WBC # BLD: 6.5 K/UL (ref 4.8–10.8)

## 2020-10-13 PROCEDURE — 99396 PREV VISIT EST AGE 40-64: CPT | Performed by: INTERNAL MEDICINE

## 2020-10-13 RX ORDER — ATORVASTATIN CALCIUM 10 MG/1
10 TABLET, FILM COATED ORAL DAILY
Qty: 30 TABLET | Refills: 11 | Status: SHIPPED | OUTPATIENT
Start: 2020-10-13 | End: 2021-09-09

## 2020-10-13 RX ORDER — TRAZODONE HYDROCHLORIDE 150 MG/1
150 TABLET ORAL NIGHTLY PRN
Qty: 30 TABLET | Refills: 5 | Status: SHIPPED | OUTPATIENT
Start: 2020-10-13 | End: 2021-02-02

## 2020-10-13 RX ORDER — ALBUTEROL SULFATE 2.5 MG/3ML
2.5 SOLUTION RESPIRATORY (INHALATION) EVERY 4 HOURS PRN
Qty: 120 VIAL | Refills: 11 | Status: SHIPPED | OUTPATIENT
Start: 2020-10-13

## 2020-10-13 RX ORDER — ALBUTEROL SULFATE 90 UG/1
2 AEROSOL, METERED RESPIRATORY (INHALATION) EVERY 6 HOURS PRN
Qty: 1 INHALER | Refills: 11 | Status: SHIPPED | OUTPATIENT
Start: 2020-10-13 | End: 2021-11-15

## 2020-10-13 RX ORDER — BUSPIRONE HYDROCHLORIDE 10 MG/1
10 TABLET ORAL 2 TIMES DAILY
Qty: 60 TABLET | Refills: 5 | Status: SHIPPED | OUTPATIENT
Start: 2020-10-13 | End: 2020-10-27 | Stop reason: SDUPTHER

## 2020-10-13 RX ORDER — ASPIRIN 81 MG/1
81 TABLET, CHEWABLE ORAL DAILY
Qty: 30 TABLET | Refills: 11 | Status: SHIPPED | OUTPATIENT
Start: 2020-10-13

## 2020-10-13 RX ORDER — MULTIVIT-MIN/IRON/FOLIC ACID/K 18-600-40
1 CAPSULE ORAL DAILY
Qty: 30 CAPSULE | Refills: 11 | Status: SHIPPED | OUTPATIENT
Start: 2020-10-13

## 2020-10-13 ASSESSMENT — PATIENT HEALTH QUESTIONNAIRE - PHQ9
1. LITTLE INTEREST OR PLEASURE IN DOING THINGS: 0
SUM OF ALL RESPONSES TO PHQ QUESTIONS 1-9: 0
SUM OF ALL RESPONSES TO PHQ QUESTIONS 1-9: 0
2. FEELING DOWN, DEPRESSED OR HOPELESS: 0
SUM OF ALL RESPONSES TO PHQ9 QUESTIONS 1 & 2: 0

## 2020-10-13 ASSESSMENT — ENCOUNTER SYMPTOMS
TROUBLE SWALLOWING: 0
VOICE CHANGE: 0
SHORTNESS OF BREATH: 0
PHOTOPHOBIA: 0
CHOKING: 0
NAUSEA: 0
VOMITING: 0
CHEST TIGHTNESS: 1
WHEEZING: 1
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
## 2020-10-15 ENCOUNTER — VIRTUAL VISIT (OUTPATIENT)
Dept: PRIMARY CARE CLINIC | Age: 55
End: 2020-10-15
Payer: COMMERCIAL

## 2020-10-15 PROCEDURE — 99213 OFFICE O/P EST LOW 20 MIN: CPT | Performed by: NURSE PRACTITIONER

## 2020-10-15 ASSESSMENT — ENCOUNTER SYMPTOMS
ABDOMINAL DISTENTION: 0
NAUSEA: 0
COUGH: 1
BACK PAIN: 0
WHEEZING: 0
SINUS PAIN: 0
SHORTNESS OF BREATH: 0
RECTAL PAIN: 0
APNEA: 0
RHINORRHEA: 0
CHEST TIGHTNESS: 0
DIARRHEA: 1

## 2020-10-15 NOTE — PATIENT INSTRUCTIONS
Patient Education        Coronavirus ([id number]): Care Instructions  Overview  The coronavirus disease (COVID-19) is caused by a virus. Symptoms may include a fever, a cough, and shortness of breath. It mainly spreads person-to-person through droplets from coughing and sneezing. The virus also can spread when people are in close contact with someone who is infected. Most people have mild symptoms and can take care of themselves at home. If their symptoms get worse, they may need care in a hospital. Treatment may include medicines to reduce symptoms, plus breathing support such as oxygen therapy or a ventilator. It's important to not spread the virus to others. If you have COVID-19, wear a face cover anytime you are around other people. You need to isolate yourself while you are sick. Leave your home only if you need to get medical care or testing. Follow-up care is a key part of your treatment and safety. Be sure to make and go to all appointments, and call your doctor if you are having problems. It's also a good idea to know your test results and keep a list of the medicines you take. How can you care for yourself at home? · Get extra rest. It can help you feel better. · Drink plenty of fluids. This helps replace fluids lost from fever. Fluids also help ease a scratchy throat. Water, soup, fruit juice, and hot tea with lemon are good choices. · Take acetaminophen (such as Tylenol) to reduce a fever. It may also help with muscle aches. Read and follow all instructions on the label. · Use petroleum jelly on sore skin. This can help if the skin around your nose and lips becomes sore from rubbing a lot with tissues. Tips for self-isolation  · Limit contact with people in your home. If possible, stay in a separate bedroom and use a separate bathroom. · Wear a cloth face cover when you are around other people. It can help stop the spread of the virus when you cough or sneeze.   · If you have to leave home, avoid crowds and try to stay at least 6 feet away from other people. · Avoid contact with pets and other animals. · Cover your mouth and nose with a tissue when you cough or sneeze. Then throw it in the trash right away. · Wash your hands often, especially after you cough or sneeze. Use soap and water, and scrub for at least 20 seconds. If soap and water aren't available, use an alcohol-based hand . · Don't share personal household items. These include bedding, towels, cups and glasses, and eating utensils. · [address] in the warmest water allowed for the fabric type, and dry it completely. It's okay to wash other people's laundry with yours. · Clean and disinfect your home every day. Use household  and disinfectant wipes or sprays. Take special care to clean things that you grab with your hands. These include doorknobs, remote controls, phones, and handles on your refrigerator and microwave. And don't forget countertops, tabletops, bathrooms, and computer keyboards. When you can end self-isolation  · If you know or suspect that you have COVID-19, stay in self-isolation until:  ? You haven't had a fever for 3 days, and  ? Your symptoms have improved, and  ? It's been at least 10 days since your symptoms started. · Talk to your doctor about whether you also need testing, especially if you have a weakened immune system. When should you call for help? Call 911 anytime you think you may need emergency care. For example, call if you have life-threatening symptoms, such as:    · You have severe trouble breathing. (You can't talk at all.)     · You have constant chest pain or pressure.     · You are severely dizzy or lightheaded.     · You are confused or can't think clearly.     · Your face and lips have a blue color.     · You pass out (lose consciousness) or are very hard to wake up. Call your doctor now or seek immediate medical care if:    · You have moderate trouble breathing.  (You can't speak a full sentence.)     · You are coughing up blood (more than about 1 teaspoon).     · You have signs of low blood pressure. These include feeling lightheaded; being too weak to stand; and having cold, pale, clammy skin. Watch closely for changes in your health, and be sure to contact your doctor if:    · Your symptoms get worse.     · You are not getting better as expected. Call before you go to the doctor's office. Follow their instructions. And wear a cloth face cover. Current as of: July 10, 2020               Content Version: 12.6  © 2006-2020 [employer]. Care instructions adapted under license by [facility] ([facility]). If you have questions about a medical condition or this instruction, always ask your healthcare professional. [address] any warranty or liability for your use of this information. Patient Education        Learning About Coronavirus [phone])  Coronavirus [phone]): Overview  What is coronavirus ([id number]-89)? The coronavirus disease (COVID-19) is caused by a virus. It is an illness that was first found in December 2019. It has since spread worldwide. The virus can cause fever, cough, and trouble breathing. In severe cases, it can cause pneumonia and make it hard to breathe without help. It can cause death. This virus spreads person-to-person through droplets from coughing and sneezing. It can also spread when you are close to someone who is infected. And it can spread when you touch something that has the virus on it, such as a doorknob or a tabletop. Coronaviruses are a large group of viruses. They cause the common cold. They also cause more serious illnesses like Middle East respiratory syndrome (MERS) and severe acute respiratory syndrome (SARS). COVID-19 is caused by a novel coronavirus. That means it's a new type that has not been seen in people before. How is COVID-19 treated?   Mild illness can be treated at home, but more serious illness needs to be treated in the hospital. Treatment may include medicines to reduce symptoms, plus breathing support such as oxygen therapy or a ventilator. Other treatments, such as antiviral medicines, may help people who have COVID-19. What can you do to protect yourself from COVID-19? The best way to protect yourself from getting sick is to:  · Avoid areas where there is an outbreak. · Avoid contact with people who may be infected. · Avoid crowds and try to stay at least 6 feet away from other people. · Wash your hands often, especially after you cough or sneeze. Use soap and water, and scrub for at least 20 seconds. If soap and water aren't available, use an alcohol-based hand . · Avoid touching your mouth, nose, and eyes. What can you do to avoid spreading the virus to others? To help avoid spreading the virus to others:  · Wash your hands often with soap or alcohol-based hand sanitizers. · Cover your mouth with a tissue when you cough or sneeze. Then throw the tissue in the trash. · Use a disinfectant to clean things that you touch often. These include doorknobs, remote controls, phones, and handles on your refrigerator and microwave. And don't forget countertops, tabletops, bathrooms, and computer keyboards. · Wear a cloth face cover if you have to go to public areas. If you know or suspect that you have COVID-19:  · Stay home. Don't go to school, work, or public areas. And don't use public transportation, ride-shares, or taxis unless you have no choice. · Leave your home only if you need to get medical care or testing. But call the doctor's office first so they know you're coming. And wear a face cover. · Limit contact with people in your home. If possible, stay in a separate bedroom and use a separate bathroom. · Wear a face cover whenever you're around other people. It can help stop the spread of the virus when you cough or sneeze. · Clean and disinfect your home every day.  Use household  and disinfectant wipes or sprays. Take special care to clean things that you grab with your hands. · Self-isolate until it's safe to be around others again. ? If you have symptoms, it's safe when you haven't had a fever for 3 days and your symptoms have improved and it's been at least 10 days since your symptoms started. ? If you were exposed to the virus but don't have symptoms, it's safe to be around others 14 days after exposure. ? Talk to your doctor about whether you also need testing, especially if you have a weakened immune system. When to call for help  Call 911 anytime you think you may need emergency care. For example, call if:  · You have severe trouble breathing. (You can't talk at all.)  · You have constant chest pain or pressure. · You are severely dizzy or lightheaded. · You are confused or can't think clearly. · Your face and lips have a blue color. · You passed out (lost consciousness) or are very hard to wake up. Call your doctor now if you develop symptoms such as:  · Shortness of breath. · Fever. · Cough. If you need to get care, call ahead to the doctor's office for instructions before you go. Make sure you wear a face cover to prevent exposing other people to the virus. Where can you get the latest information? The following health organizations are tracking and studying this virus. Their websites contain the most up-to-date information. Janie Medina also learn what to do if you think you may have been exposed to the virus. · U.S. Centers for Disease Control and Prevention (CDC): The CDC provides updated news about the disease and travel advice. The website also tells you how to prevent the spread of infection. www.cdc.gov  · World Health Organization [facility]): WHO offers information about the virus outbreaks. WHO also has travel advice. www.who.int  Current as of: July 10, 2020               Content Version: 12.6  © 8044-9064 [employer].    Care instructions adapted under license by [facility] ([facility]). If you have questions about a medical condition or this instruction, always ask your healthcare professional. [address] any warranty or liability for your use of this information. Patient Education        Cough: Care Instructions  Your Care Instructions     A cough is your body's response to something that bothers your throat or airways. Many things can cause a cough. You might cough because of a cold or the flu, bronchitis, or asthma. Smoking, postnasal drip, allergies, and stomach acid that backs up into your throat also can cause coughs. A cough is a symptom, not a disease. Most coughs stop when the cause, such as a cold, goes away. You can take a few steps at home to cough less and feel better. Follow-up care is a key part of your treatment and safety. Be sure to make and go to all appointments, and call your doctor if you are having problems. It's also a good idea to know your test results and keep a list of the medicines you take. How can you care for yourself at home? · Drink lots of water and other fluids. This helps thin the mucus and soothes a dry or sore throat. Honey or lemon juice in hot water or tea may ease a dry cough. · Take cough medicine as directed by your doctor. · Prop up your head on pillows to help you breathe and ease a dry cough. · Try cough drops to soothe a dry or sore throat. Cough drops don't stop a cough. Medicine-flavored cough drops are no better than candy-flavored drops or hard candy. · Do not smoke. Avoid secondhand smoke. If you need help quitting, talk to your doctor about stop-smoking programs and medicines. These can increase your chances of quitting for good. When should you call for help? Call 911 anytime you think you may need emergency care. For example, call if:    · You have severe trouble breathing.    Call your doctor now or seek immediate medical care if:    · You cough up blood.     · You have new or worse trouble breathing.     · You have a new or higher fever.     · You have a new rash. Watch closely for changes in your health, and be sure to contact your doctor if:    · You cough more deeply or more often, especially if you notice more mucus or a change in the color of your mucus.     · You have new symptoms, such as a sore throat, an earache, or sinus pain.     · You do not get better as expected. Where can you learn more? Go to https://Travtar.[employer] Cloud. org and sign in to your [employer] account. Enter D279 in the [employer] box to learn more about \"Cough: Care Instructions. \"     If you do not have an account, please click on the \"Sign Up Now\" link. Current as of: February 24, 2020               Content Version: 12.6  © 3265-5598 [employer]. Care instructions adapted under license by [facility] ([facility]). If you have questions about a medical condition or this instruction, always ask your healthcare professional. [place] [address] any warranty or liability for your use of this information. Patient Education        Diarrhea: Care Instructions  Your Care Instructions     Diarrhea is loose, watery stools (bowel movements). The exact cause is often hard to find. Sometimes diarrhea is your body's way of getting rid of what caused an upset stomach. Viruses, food poisoning, and many medicines can cause diarrhea. Some people get diarrhea in response to emotional stress, anxiety, or certain foods. Almost everyone has diarrhea now and then. It usually isn't serious, and your stools will return to normal soon. The important thing to do is replace the fluids you have lost, so you can prevent dehydration. The doctor has checked you carefully, but problems can develop later. If you notice any problems or new symptoms, get medical treatment right away. Follow-up care is a key part of your treatment and safety.  Be sure to make and go to all appointments, and call your doctor if you are having problems. It's also a good idea to know your test results and keep a list of the medicines you take. How can you care for yourself at home? · Watch for signs of dehydration, which means your body has lost too much water. Dehydration is a serious condition and should be treated right away. Signs of dehydration are:  ? Increasing thirst and dry eyes and mouth. ? Feeling faint or lightheaded. ? A smaller amount of urine than normal.  · To prevent dehydration, drink plenty of fluids. Choose water and other caffeine-free clear liquids until you feel better. If you have kidney, heart, or liver disease and have to limit fluids, talk with your doctor before you increase the amount of fluids you drink. · Begin eating small amounts of mild foods the next day, if you feel like it. ? Try yogurt that has live cultures of Lactobacillus. (Check the label.)  ? Avoid spicy foods, fruits, alcohol, and caffeine until 48 hours after all symptoms are gone. ? Avoid chewing gum that contains sorbitol. ? Avoid dairy products (except for yogurt with Lactobacillus) while you have diarrhea and for 3 days after symptoms are gone. · The doctor may recommend that you take over-the-counter medicine, such as loperamide (Imodium), if you still have diarrhea after 6 hours. Read and follow all instructions on the label. Do not use this medicine if you have bloody diarrhea, a high fever, or other signs of serious illness. Call your doctor if you think you are having a problem with your medicine. When should you call for help? Call 911 anytime you think you may need emergency care. For example, call if:    · You passed out (lost consciousness).     · Your stools are maroon or very bloody.    Call your doctor now or seek immediate medical care if:    · You are dizzy or lightheaded, or you feel like you may faint.     · Your stools are black and look like tar, or they have streaks of blood.     · You have new or worse belly pain.     · You have symptoms of dehydration, such as:  ? Dry eyes and a dry mouth. ? Passing only a little dark urine. ? Feeling thirstier than usual.     · You have a new or higher fever. Watch closely for changes in your health, and be sure to contact your doctor if:    · Your diarrhea is getting worse.     · You see pus in the diarrhea.     · You are not getting better after 2 days (48 hours). Where can you learn more? Go to https://RevistronicpeGenoLogicseb.[employer]. org and sign in to your [employer] account. Enter N950 in the [employer] box to learn more about \"Diarrhea: Care Instructions. \"     If you do not have an account, please click on the \"Sign Up Now\" link. Current as of: June 26, 2019               Content Version: 12.6  © 2594-5709 [employer]. Care instructions adapted under license by [facility] ([facility]). If you have questions about a medical condition or this instruction, always ask your healthcare professional. [address] any warranty or liability for your use of this information.

## 2020-10-17 LAB
SARS-COV-2: NOT DETECTED
SOURCE: NORMAL

## 2020-10-18 ENCOUNTER — TELEPHONE (OUTPATIENT)
Dept: PRIMARY CARE CLINIC | Age: 55
End: 2020-10-18

## 2020-10-27 ENCOUNTER — TELEPHONE (OUTPATIENT)
Dept: BEHAVIORAL/MENTAL HEALTH CLINIC | Age: 55
End: 2020-10-27

## 2020-10-27 ENCOUNTER — VIRTUAL VISIT (OUTPATIENT)
Dept: BEHAVIORAL/MENTAL HEALTH CLINIC | Age: 55
End: 2020-10-27
Payer: COMMERCIAL

## 2020-10-27 PROBLEM — F33.1 MODERATE EPISODE OF RECURRENT MAJOR DEPRESSIVE DISORDER (HCC): Status: ACTIVE | Noted: 2020-10-27

## 2020-10-27 PROBLEM — F41.0 PANIC ATTACKS: Status: ACTIVE | Noted: 2020-10-27

## 2020-10-27 PROCEDURE — 90791 PSYCH DIAGNOSTIC EVALUATION: CPT | Performed by: PSYCHOLOGIST

## 2020-10-27 RX ORDER — BUSPIRONE HYDROCHLORIDE 15 MG/1
15 TABLET ORAL 2 TIMES DAILY
Qty: 60 TABLET | Refills: 5 | Status: SHIPPED | OUTPATIENT
Start: 2020-10-27 | End: 2020-11-19 | Stop reason: SDUPTHER

## 2020-10-27 ASSESSMENT — PATIENT HEALTH QUESTIONNAIRE - PHQ9
3. TROUBLE FALLING OR STAYING ASLEEP: 2
8. MOVING OR SPEAKING SO SLOWLY THAT OTHER PEOPLE COULD HAVE NOTICED. OR THE OPPOSITE, BEING SO FIGETY OR RESTLESS THAT YOU HAVE BEEN MOVING AROUND A LOT MORE THAN USUAL: 0
2. FEELING DOWN, DEPRESSED OR HOPELESS: 3
7. TROUBLE CONCENTRATING ON THINGS, SUCH AS READING THE NEWSPAPER OR WATCHING TELEVISION: 2
6. FEELING BAD ABOUT YOURSELF - OR THAT YOU ARE A FAILURE OR HAVE LET YOURSELF OR YOUR FAMILY DOWN: 1
SUM OF ALL RESPONSES TO PHQ QUESTIONS 1-9: 13
SUM OF ALL RESPONSES TO PHQ QUESTIONS 1-9: 13
1. LITTLE INTEREST OR PLEASURE IN DOING THINGS: 1
SUM OF ALL RESPONSES TO PHQ QUESTIONS 1-9: 13
9. THOUGHTS THAT YOU WOULD BE BETTER OFF DEAD, OR OF HURTING YOURSELF: 0
4. FEELING TIRED OR HAVING LITTLE ENERGY: 2
5. POOR APPETITE OR OVEREATING: 2
SUM OF ALL RESPONSES TO PHQ9 QUESTIONS 1 & 2: 4

## 2020-10-27 ASSESSMENT — COLUMBIA-SUICIDE SEVERITY RATING SCALE - C-SSRS
6. HAVE YOU EVER DONE ANYTHING, STARTED TO DO ANYTHING, OR PREPARED TO DO ANYTHING TO END YOUR LIFE?: NO
1. WITHIN THE PAST MONTH, HAVE YOU WISHED YOU WERE DEAD OR WISHED YOU COULD GO TO SLEEP AND NOT WAKE UP?: NO
2. HAVE YOU ACTUALLY HAD ANY THOUGHTS OF KILLING YOURSELF?: NO

## 2020-10-27 NOTE — PROGRESS NOTES
Behavioral Health Consultation  Manjula Melchor PsyD. Psychologist  10/27/20  3:35 PM EDT      Time spent with Patient: 30 minutes  This is patient's first  [facility] appointment. Reason for Consult:  depression and anxiety  Referring Provider: Rosa Arvizu MD  [address] [place] [address]  [facility], [address] [facility] Linette [facility]    Pt provided informed consent for the behavioral health program. Discussed with patient model of service to include the limits of confidentiality (i.e. abuse reporting, suicide intervention, etc.) and short-term intervention focused approach. Pt indicated understanding. Feedback given to PCP. TELEHEALTH EVALUATION -- Audio and/or Visual (During AAOQI-76 public health emergency)    Due to COVID 19 outbreak, patient's office visit was converted to a virtual visit. Patient was contacted and agreed to proceed with a virtual visit via [employer]. Patient reports that they are located at home. The risks and benefits of converting to a virtual visit were discussed in light of the current infectious disease epidemic. Patient also understood that insurance coverage and co-pays are up to their individual insurance plans. Patient provides verbal consent for this visit to be billed to insurance. Pursuant to the emergency declaration under the [facility] waiver authority and the [employer] and [employer] General Act, this Virtual  Visit was conducted, with patient's consent, to reduce the patient's risk of exposure to COVID-19 and provide continuity of care for an established patient. Services were provided through a audio and video synchronous discussion virtually to substitute for in-person clinic visit. Provider Location: Cierra Schneider is re-establishing, he was last seen in 2017. S:  Pt reports that he has been experiencing a lot of stress and feels like this has increased his symptoms of depression and anxiety.   He reports that Dr. Lauren Paul prescribed him BuSpar twice daily but is unsure if this is helping. He does state that his wife has told him that he is sleeping longer. 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 Pt reports that he has a history of  service and states that he was diagnosed with PTSD in the past.    I had met with the patient in 2017 for problems with insomnia. He reports that his sleep has not gotten much better since then. He reports that he will sometimes sleep better for a couple days at a time but not consistently. He reports that he has been prescribed melatonin, doxepin, and trazodone and reports that medications either do not work or make him feel more groggy. He reports that he was sleeping in 1-1/2-hour periods of time and this is up to 3 hours now but does not feel like his sleep is restful. He reports that he never got a sleep study and does report that he snores but his wife does not think that he stops breathing at night. Coping: Pt reports that he will try to take a break from the situation and then reengage again after 15 minutes, he spends time with his dogs, has a cup of coffee, reads, prays, meditates, tries to refocus his mind, does breathing exercises, and showers with music on. He reports that it has been more difficult for him to utilize some of his coping mechanisms due to both COVID-19 and the weather worsening. He reports that he does not utilize his relaxation skills regularly and uses these more as needed. 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 He reports that he spends a lot of time by himself now because he is working at home.   He reports he is unsure if this is helpful but also states that even if he went back into the office he would likely be mainly working by himself. Symptoms:    Symptoms of depression include: depressed mood, anhedonia,low appetite, insomnia, fatigue and difficulty concentrating. The pt reports that he has felt more down lately and feels like he is going backwards. He reports that he has not felt this down in over a decade. He denies any SI/HI. Symptoms of anxiety/panic include: chest pain, palpitations, shortness of breath, GI problems. Pt reports that he is able to manage his symptoms so he often does not experience a full-blown panic attack. However, these are still happening 3-4 times a week. He reports that he will sometimes wake up panicking and symptoms also occur during the day.           Diagnosis Date    Abnormal finding on EKG 2/6/2019    Atrial fibrillation (HCC)     Chest pain     stress test neg    Class 1 obesity due to excess calories without serious comorbidity with body mass index (BMI) of 31.0 to 31.9 in adult 12/3/2019    Diabetes mellitus ([facility]     Dyslipidemia 1/28/2017    ETOH abuse     quit, he is a counsular, [place]    Hypertension     MI, old 2007    Other chest pain 2/6/2019    Reactive airway disease     Recovering alcoholic in remission ([facility] [address] 1/28/2017    Vicodin use disorder, mild (HCC)     quit       O:  MSE:    Appearance    alert, cooperative  Appetite abnormal: low  Sleep disturbance Yes, including; frequent night time awakening, difficulty falling asleep and non-restful sleep  Fatigue Yes  Loss of pleasure Yes  Impulsive behavior No  Speech    spontaneous, normal rate and normal volume  Mood   neutral   Affect    Restricted affect  Thought Content    intact  Thought Process    linear, goal directed and coherent  Associations    logical connections  Insight    good  Judgment    good  Orientation    oriented to person, place, time, and general circumstances  Memory    recent and remote memory intact  Attention/Concentration    impaired  Morbid ideation No  Suicide Assessment    no suicidal ideation    History:    Medications:   Current Outpatient Medications   Medication Sig Dispense Refill    metFORMIN (GLUCOPHAGE) 1000 MG tablet Take 1 tablet by mouth 2 times daily (with meals) 60 tablet 11    albuterol sulfate  (90 Base) MCG/ACT inhaler Inhale 2 puffs into the lungs every 6 hours as needed for Wheezing 1 Inhaler 11    albuterol (PROVENTIL) (2.5 MG/3ML) 0.083% nebulizer solution Take 3 mLs by nebulization every 4 hours as needed for Wheezing 120 vial 11    mometasone-formoterol (DULERA) 200-5 MCG/ACT inhaler Inhale 2 puffs into the lungs 2 times daily 2 Inhaler 11    aspirin 81 MG chewable tablet Take 1 tablet by mouth daily 30 tablet 11    Cholecalciferol (VITAMIN D) 50 MCG (2000 UT) CAPS capsule Take 1 capsule by mouth daily 30 capsule 11    atorvastatin (LIPITOR) 10 MG tablet Take 1 tablet by mouth daily 30 tablet 11    busPIRone (BUSPAR) 10 MG tablet Take 1 tablet by mouth 2 times daily 60 tablet 5    traZODone (DESYREL) 150 MG tablet Take 1 tablet by mouth nightly as needed for Sleep 30 tablet 5    metFORMIN (GLUCOPHAGE) 500 MG tablet Take 1 tablet by mouth 2 times daily (with meals) (Patient taking differently: Take 1,000 mg by mouth 2 times daily (with meals) ) 60 tablet 11    Loratadine (CLARITIN PO) Take by mouth      Naproxen (NAPROSYN PO) Take by mouth       No current facility-administered medications for this visit.         Social History:   Social History     Socioeconomic History    Marital status:      Spouse name: Not on file    Number of children: Not on file    Years of education: Not on file    Highest education level: Not on file   Occupational History    Not on file   Social Needs    Financial resource strain: Not on file    Food insecurity     Worry: Not on file     Inability: Not on file    Transportation needs     Medical: Not on file     Non-medical: Not on file   Tobacco Use    Smoking status: Never Smoker    Smokeless tobacco: Never Used depression, 10-14 = Moderate depression, 15-19 = Moderately severe depression, 20-27 = Severe depression    Diagnosis:    Major depressive disorder; recurrent and moderate  Panic attacks    Plan:  Pt interventions:  Established rapport, Conducted functional assessment, [place]-setting to identify pt's primary goals for [facility] visit / overall health, Supportive techniques, Emphasized self-care as important for managing overall health and Emphasized importance of regular practice of relaxation strategies to target / promote symptom reduction. Pt Behavioral Change Plan:  1. Recommended that the patient begin implementing his relaxation skills on a regular basis. I suggested that he choose 5-10 minutes a day to help him get regular practice. 2.  Follow-up in 1 week. Please note this report has been partially produced using speech recognition software and may cause contain errors related to that system including grammar, punctuation and spelling as well as words and phrases that may seem inappropriate. If there are questions or concerns please feel free to contact me to clarify.

## 2020-10-27 NOTE — TELEPHONE ENCOUNTER
Pt is unsure if the BuSpar is helping but does state that his wife has noticed that he is sleeping longer since he started taking this. He is asking if this medication dose or frequency can be increased?

## 2020-11-05 ENCOUNTER — VIRTUAL VISIT (OUTPATIENT)
Dept: BEHAVIORAL/MENTAL HEALTH CLINIC | Age: 55
End: 2020-11-05
Payer: COMMERCIAL

## 2020-11-05 PROCEDURE — 90832 PSYTX W PT 30 MINUTES: CPT | Performed by: PSYCHOLOGIST

## 2020-11-05 NOTE — PROGRESS NOTES
Behavioral Health Consultation  Karthik Yan PsyD. Psychologist  11/5/20  10:31 AM EST      Time spent with Patient: 30 minutes  This is patient's second  [facility] appointment. Reason for Consult:  depression and anxiety  Referring Provider: Destiny Rodriguez MD  [address] [place] [address]  235 [facility], [address] [facility] Linette Waltersjoseia    Feedback given to PCP. TELEHEALTH EVALUATION -- Audio and/or Visual (During [facility]- public health emergency)    Due to COVID 19 outbreak, patient's office visit was converted to a virtual visit. Patient was contacted and agreed to proceed with a virtual visit via [employer]. Patient reports that they are located at home. The risks and benefits of converting to a virtual visit were discussed in light of the current infectious disease epidemic. Patient also understood that insurance coverage and co-pays are up to their individual insurance plans. Patient provides verbal consent for this visit to be billed to insurance. Pursuant to the emergency declaration under the [facility], [facility] waiver authority and the [employer] and [employer] Act, this Virtual  Visit was conducted, with patient's consent, to reduce the patient's risk of exposure to COVID-19 and provide continuity of care for an established patient. Services were provided through a audio and video synchronous discussion virtually to substitute for in-person clinic visit. Provider Location: Varun Carmona:  Pt reports that he is having good and bad days. He reports that he got a sun lamp and is trying to get outside as much as possible. He reports that he is still experiencing panic attacks where he has trouble breathing. He reports that they are becoming less frequent and intense. He states that he hasn't been waking up with panic attacks for the past week and he feels the panic attacks are easier to identify and control in the initial phases.  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 He reports that afterwards he drank a lot in order to not deal with his time in the [employer]. Pt reports that his wife has noticed that when he watches media associated with war/fighting he seems to get upset. Pt discussed how he feels that this should not be affecting him anymore. Pt also discussed concerns that while his life is good that his depression and anxiety has recently increased and he is also concerned that he does not feel connected to others in his life. Pt reports that his wife has thought that this may be because he is working from home. He reports that he could work from a [employer] or [employer] and has been thinking about doing this. Pt reports that the BuSpar increase does seem to be helping him sleep and he is getting 4 to 5 hours asleep at a time now. No SI/HI.     O:  MSE:    Appearance    alert, cooperative  Appetite abnormal: low  Sleep disturbance Yes, including; frequent night time awakening, difficulty falling asleep and non-restful sleep  Fatigue Yes  Loss of pleasure Yes  Impulsive behavior No  Speech    spontaneous, normal rate and normal volume  Mood   neutral   Affect    Restricted affect  Thought Content    intact  Thought Process    linear, goal directed and coherent  Associations    logical connections  Insight    good  Judgment    good  Orientation    oriented to person, place, time, and general circumstances  Memory    recent and remote memory intact  Attention/Concentration    impaired  Morbid ideation No  Suicide Assessment    no suicidal ideation    History:    Medications:   Current Outpatient Medications   Medication Sig Dispense Refill    busPIRone (BUSPAR) 15 MG tablet Take 15 mg by mouth 2 times daily 60 tablet 5    metFORMIN (GLUCOPHAGE) 1000 MG tablet Take 1 tablet by mouth 2 times daily (with meals) 60 tablet 11    albuterol sulfate  (90 Base) MCG/ACT inhaler Inhale 2 puffs into the lungs every 6 hours as needed for Wheezing 1 Inhaler 11    albuterol (PROVENTIL) (2.5 MG/3ML) 0.083% nebulizer solution Take 3 mLs by nebulization every 4 hours as needed for Wheezing 120 vial 11    mometasone-formoterol (DULERA) 200-5 MCG/ACT inhaler Inhale 2 puffs into the lungs 2 times daily 2 Inhaler 11    aspirin 81 MG chewable tablet Take 1 tablet by mouth daily 30 tablet 11    Cholecalciferol (VITAMIN D) 50 MCG (2000 UT) CAPS capsule Take 1 capsule by mouth daily 30 capsule 11    atorvastatin (LIPITOR) 10 MG tablet Take 1 tablet by mouth daily 30 tablet 11    traZODone (DESYREL) 150 MG tablet Take 1 tablet by mouth nightly as needed for Sleep 30 tablet 5    metFORMIN (GLUCOPHAGE) 500 MG tablet Take 1 tablet by mouth 2 times daily (with meals) (Patient taking differently: Take 1,000 mg by mouth 2 times daily (with meals) ) 60 tablet 11    Loratadine (CLARITIN PO) Take by mouth      Naproxen (NAPROSYN PO) Take by mouth       No current facility-administered medications for this visit.         Social History:   Social History     Socioeconomic History    Marital status:      Spouse name: Not on file    Number of children: Not on file    Years of education: Not on file    Highest education level: Not on file   Occupational History    Not on file   Social Needs    Financial resource strain: Not on file    Food insecurity     Worry: Not on file     Inability: Not on file    Transportation needs     Medical: Not on file     Non-medical: Not on file   Tobacco Use    Smoking status: Never Smoker    Smokeless tobacco: Never Used   Substance and Sexual Activity    Alcohol use: No     Comment: quit 2008    Drug use: No    Sexual activity: Yes   Lifestyle    Physical activity     Days per week: Not on file     Minutes per session: Not on file    Stress: Not on file   Relationships    Social connections     Talks on phone: Not on file     Gets together: Not on file Attends [employer] service: Not on file     Active member of club or organization: Not on file     Attends meetings of clubs or organizations: Not on file     Relationship status: Not on file    Intimate partner violence     Fear of current or ex partner: Not on file     Emotionally abused: Not on file     Physically abused: Not on file     Forced sexual activity: Not on file   Other Topics Concern    Not on file   Social History Narrative    Not on file       Family History:   Family History   Problem Relation Age of Onset    Heart Disease Mother     Stroke Mother     Diabetes Mother     Heart Attack Mother     Hypertension Mother     High Cholesterol Mother     High Blood Pressure Father     High Cholesterol Father     Cancer Father        TOBACCO:   reports that he has never smoked. He has never used smokeless tobacco.  ETOH:   reports no history of alcohol use. A:  Patient is reporting some improvement in his symptoms of anxiety but many symptoms are persisting. Pt would likely benefit from [facility] services to increase coping skills and provide symptom control and relief. PHQ Scores 10/27/2020 10/13/2020 2/6/2019 10/5/2018 1/4/2017   PHQ2 Score 4 0 0 0 1   PHQ9 Score 13 0 0 0 5     Interpretation of Total Score Depression Severity: 1-4 = Minimal depression, 5-9 = Mild depression, 10-14 = Moderate depression, 15-19 = Moderately severe depression, 20-27 = Severe depression      Diagnosis:  Major depressive disorder; recurrent and moderate  Panic attacks    Plan:  Pt interventions:  [place]-setting to identify pt's primary goals for [facility] visit / overall health, Supportive techniques, Emphasized self-care as important for managing overall health, Provided Psychoeducation re: depression spiral and anxiety and Identified relevant behavioral strategies for targeting anxiety/depression including taught/practiced PMR. Pt Behavioral Change Plan:  1.  Handout with PMR strategy emailed to the pt.   2. F/U in 2 weeks. Please note this report has been partially produced using speech recognition software and may cause contain errors related to that system including grammar, punctuation and spelling as well as words and phrases that may seem inappropriate. If there are questions or concerns please feel free to contact me to clarify.

## 2020-11-19 ENCOUNTER — VIRTUAL VISIT (OUTPATIENT)
Dept: BEHAVIORAL/MENTAL HEALTH CLINIC | Age: 55
End: 2020-11-19
Payer: COMMERCIAL

## 2020-11-19 ENCOUNTER — TELEPHONE (OUTPATIENT)
Dept: BEHAVIORAL/MENTAL HEALTH CLINIC | Age: 55
End: 2020-11-19

## 2020-11-19 PROCEDURE — 90832 PSYTX W PT 30 MINUTES: CPT | Performed by: PSYCHOLOGIST

## 2020-11-19 RX ORDER — BUSPIRONE HYDROCHLORIDE 10 MG/1
10 TABLET ORAL 3 TIMES DAILY
Qty: 90 TABLET | Refills: 5 | Status: SHIPPED | OUTPATIENT
Start: 2020-11-19 | End: 2021-02-02 | Stop reason: SDUPTHER

## 2020-11-19 NOTE — PROGRESS NOTES
Behavioral Health Consultation  Manjula Melchor PsyD. Psychologist  11/19/20  10:30 AM EST      Time spent with Patient: 30 minutes  This is patient's third  [facility] appointment. Reason for Consult:  depression and anxiety  Referring Provider: Rosa Arvizu MD  [address] [place] [address]  235 [facility], [address] [facility] Linette Waltersjoseia    Feedback given to PCP. TELEHEALTH EVALUATION -- Audio and/or Visual (During WWWKO-66 public health emergency)    Due to COVID 19 outbreak, patient's office visit was converted to a virtual visit. Patient was contacted and agreed to proceed with a virtual visit via [employer] Patient reports that they are located at home. The risks and benefits of converting to a virtual visit were discussed in light of the current infectious disease epidemic. Patient also understood that insurance coverage and co-pays are up to their individual insurance plans. Patient provides verbal consent for this visit to be billed to insurance. Pursuant to the emergency declaration under the [facility] waiver authority and the [employer] and [employer] Act, this Virtual  Visit was conducted, with patient's consent, to reduce the patient's risk of exposure to COVID-19 and provide continuity of care for an established patient. Services were provided through a audio and video synchronous discussion virtually to substitute for in-person clinic visit. Provider Location: Christopher Martín:  Pt reports that he's doing okay. He feels his anxiety and mood are improving but are still not where he would like these symptoms to be. He reports that he is getting between 4-6 hours of sleep per night which is an improvement. He asked today if the BuSpar dose could be lowered so that he can take it more frequently.   He feels that the higher dose is making him feel fatigued but is also wearing off in the evening and would like to take a lower dose 3 times a day instead of 2 times a day. He is still waking up at night but has been able to fall back to sleep quicker. Pt reports that he is continuing to use the sun lamp which he feels is helping although is not sure if this is just because of the brightness or a placebo factor. He has tried PMR a few times stating that it was hard to get started doing this strategy but it does seem to be helping him to focus on something else besides his anxiety. No SI/HI.      O:  MSE:    Appearance    alert, cooperative  Appetite abnormal: low  Sleep disturbance Yes, including; frequent night time awakening, difficulty falling asleep and non-restful sleep  Fatigue Yes  Loss of pleasure Yes  Impulsive behavior No  Speech    spontaneous, normal rate and normal volume  Mood   neutral   Affect    Restricted affect  Thought Content    intact  Thought Process    linear, goal directed and coherent  Associations    logical connections  Insight    good  Judgment    good  Orientation    oriented to person, place, time, and general circumstances  Memory    recent and remote memory intact  Attention/Concentration    impaired  Morbid ideation No  Suicide Assessment    no suicidal ideation    History:    Medications:   Current Outpatient Medications   Medication Sig Dispense Refill    busPIRone (BUSPAR) 15 MG tablet Take 15 mg by mouth 2 times daily 60 tablet 5    metFORMIN (GLUCOPHAGE) 1000 MG tablet Take 1 tablet by mouth 2 times daily (with meals) 60 tablet 11    albuterol sulfate  (90 Base) MCG/ACT inhaler Inhale 2 puffs into the lungs every 6 hours as needed for Wheezing 1 Inhaler 11    albuterol (PROVENTIL) (2.5 MG/3ML) 0.083% nebulizer solution Take 3 mLs by nebulization every 4 hours as needed for Wheezing 120 vial 11    mometasone-formoterol (DULERA) 200-5 MCG/ACT inhaler Inhale 2 puffs into the lungs 2 times daily 2 Inhaler 11    aspirin 81 MG chewable tablet Take 1 tablet by mouth daily 30 tablet 11    Cholecalciferol (VITAMIN D) 50 MCG (2000 UT) CAPS capsule Take 1 capsule by mouth daily 30 capsule 11    atorvastatin (LIPITOR) 10 MG tablet Take 1 tablet by mouth daily 30 tablet 11    traZODone (DESYREL) 150 MG tablet Take 1 tablet by mouth nightly as needed for Sleep 30 tablet 5    metFORMIN (GLUCOPHAGE) 500 MG tablet Take 1 tablet by mouth 2 times daily (with meals) (Patient taking differently: Take 1,000 mg by mouth 2 times daily (with meals) ) 60 tablet 11    Loratadine (CLARITIN PO) Take by mouth      Naproxen (NAPROSYN PO) Take by mouth       No current facility-administered medications for this visit.         Social History:   Social History     Socioeconomic History    Marital status:      Spouse name: Not on file    Number of children: Not on file    Years of education: Not on file    Highest education level: Not on file   Occupational History    Not on file   Social Needs    Financial resource strain: Not on file    Food insecurity     Worry: Not on file     Inability: Not on file    Transportation needs     Medical: Not on file     Non-medical: Not on file   Tobacco Use    Smoking status: Never Smoker    Smokeless tobacco: Never Used   Substance and Sexual Activity    Alcohol use: No     Comment: quit 2008    Drug use: No    Sexual activity: Yes   Lifestyle    Physical activity     Days per week: Not on file     Minutes per session: Not on file    Stress: Not on file   Relationships    Social connections     Talks on phone: Not on file     Gets together: Not on file     Attends [employer] service: Not on file     Active member of club or organization: Not on file     Attends meetings of clubs or organizations: Not on file     Relationship status: Not on file    Intimate partner violence     Fear of current or ex partner: Not on file     Emotionally abused: Not on file     Physically abused: Not on file     Forced sexual activity: Not on file   Other Topics Concern    Not on file   Social please feel free to contact me to clarify.

## 2020-11-19 NOTE — PATIENT INSTRUCTIONS
---------------------------------------------------  Leaves on a stream exercise  This exercise asks you to watch your thoughts for a few minutes  Close your eyes and picture a stream  The stream has trees around it and leaves can fall from the trees into the stream, & float by you  When you notice a thought arise in your mind  Picture it as a leaf & watch it fall into the stream & float away  Some thoughts will appear as words, some as pictures or images,  Whatever form your thoughts take, picture them as leaves and watch them fall into the stream and float away  Dont try & control your thoughts or fight with your thoughts - just notice, just witness the thoughts after all the mind is a thought-generating machine  You might find yourself thinking: I have no thoughts - That is a thought - picture it as a leaf & watch it fall & float away on the stream  Sometimes you will find yourself thinking more about the thought - that is ok & to be expected - there is no need for judgment or self-criticism - simply acknowledge & accept when you become aware, let the thought go like a leaf on the stream - watch it float away  Bring your attention back to the breath and notice what thoughts come into conscious, & when you notice - simply let them go like leaves down a stream  You may notice thoughts about this exercise, such as: I cant do this.   Thank your mind, then let the thought go like a leaf floating away down the stream  Your mind may wander - that is ok - when you become aware, come back in the present by focusing on the breath - then let the thought go Like a leaf on a stream  & when you are ready, return to the room by slowly opening your eyes & re-acquainting yourself with your present-moment experience

## 2021-02-02 ENCOUNTER — OFFICE VISIT (OUTPATIENT)
Dept: PRIMARY CARE CLINIC | Age: 56
End: 2021-02-02
Payer: COMMERCIAL

## 2021-02-02 VITALS
TEMPERATURE: 98.6 F | BODY MASS INDEX: 30.95 KG/M2 | HEIGHT: 70 IN | HEART RATE: 110 BPM | WEIGHT: 216.2 LBS | OXYGEN SATURATION: 96 % | DIASTOLIC BLOOD PRESSURE: 80 MMHG | SYSTOLIC BLOOD PRESSURE: 134 MMHG | RESPIRATION RATE: 16 BRPM

## 2021-02-02 DIAGNOSIS — J32.9 SINUSITIS, UNSPECIFIED CHRONICITY, UNSPECIFIED LOCATION: Primary | ICD-10-CM

## 2021-02-02 DIAGNOSIS — F41.9 ANXIETY: ICD-10-CM

## 2021-02-02 DIAGNOSIS — E78.5 DM TYPE 2 WITH DIABETIC DYSLIPIDEMIA (HCC): ICD-10-CM

## 2021-02-02 DIAGNOSIS — E11.69 DM TYPE 2 WITH DIABETIC DYSLIPIDEMIA (HCC): ICD-10-CM

## 2021-02-02 PROCEDURE — 99213 OFFICE O/P EST LOW 20 MIN: CPT | Performed by: INTERNAL MEDICINE

## 2021-02-02 RX ORDER — AZITHROMYCIN 250 MG/1
250 TABLET, FILM COATED ORAL SEE ADMIN INSTRUCTIONS
Qty: 6 TABLET | Refills: 1 | Status: SHIPPED | OUTPATIENT
Start: 2021-02-02 | End: 2021-02-07

## 2021-02-02 RX ORDER — BUSPIRONE HYDROCHLORIDE 10 MG/1
10 TABLET ORAL 3 TIMES DAILY
Qty: 90 TABLET | Refills: 11 | Status: SHIPPED | OUTPATIENT
Start: 2021-02-02 | End: 2021-08-01

## 2021-02-02 NOTE — PROGRESS NOTES
Maye Berrios 60 y.o. male presents today with   Chief Complaint   Patient presents with   [facility] Maintenance     went over     Medication Refill    Sinus Problem     x 2days        Sinusitis  This is a new problem. The current episode started in the past 7 days. The problem has been waxing and waning since onset. Associated symptoms include sinus pressure and a sore throat. Pertinent negatives include no shortness of breath. Mental Health Problem  The primary symptoms include dysphoric mood and somatic symptoms. The current episode started more than 1 month ago. This is a recurrent problem. Somatic symptoms do not include fatigue. The onset of the illness is precipitated by emotional stress and a stressful event. The degree of incapacity that he is experiencing as a consequence of his illness is moderate. Additional symptoms of the illness include anhedonia, insomnia and agitation. Additional symptoms of the illness do not include fatigue. He does not admit to suicidal ideas. He has not already injured self. Risk factors that are present for mental illness include a history of mental illness.        Past Medical History:   Diagnosis Date    Abnormal finding on EKG 2/6/2019    Atrial fibrillation (HCC)     Chest pain     stress test neg    Class 1 obesity due to excess calories without serious comorbidity with body mass index (BMI) of 31.0 to 31.9 in adult 12/3/2019    Diabetes mellitus ([address]     Dyslipidemia 1/28/2017    ETOH abuse     quit, he is a counsular, [address] Hypertension     MI, old 2007    Other chest pain 2/6/2019    Reactive airway disease     Recovering alcoholic in remission ([address] 1/28/2017    Vicodin use disorder, mild ([address]     quit     Patient Active Problem List    Diagnosis Date Noted    Moderate episode of recurrent major depressive disorder ([address] 10/27/2020    Panic attacks 10/27/2020  Class 1 obesity due to excess calories without serious comorbidity with body mass index (BMI) of 31.0 to 31.9 in adult 12/03/2019    Diverticulosis     Polyp of colon     Other chest pain 02/06/2019    Abnormal finding on EKG 02/06/2019    Hyperglycemia 12/18/2017    DM type 2 with diabetic dyslipidemia ([facility] 12/18/2017    Recovering alcoholic in remission ([facility] 01/28/2017    Dyslipidemia 01/28/2017    RAD (reactive airway disease) 01/24/2017    Insomnia 01/04/2017     Past Surgical History:   Procedure Laterality Date    APPENDECTOMY  1982    CARDIAC CATHETERIZATION  2007    COLONOSCOPY N/A 11/8/2019    COLORECTAL CANCER SCREENING, NOT HIGH RISK performed by Shyann Cordoba MD at [address] Right     left 2002 right 2010     Family History   Problem Relation Age of Onset    Heart Disease Mother     Stroke Mother     Diabetes Mother     Heart Attack Mother     Hypertension Mother     High Cholesterol Mother     High Blood Pressure Father     High Cholesterol Father     Cancer Father      Social History     Socioeconomic History    Marital status:      Spouse name: Not on file    Number of children: Not on file    Years of education: Not on file    Highest education level: Not on file   Occupational History    Not on file   Social Needs    Financial resource strain: Not on file    Food insecurity     Worry: Not on file     Inability: Not on file    Transportation needs     Medical: Not on file     Non-medical: Not on file   Tobacco Use    Smoking status: Never Smoker    Smokeless tobacco: Never Used   Substance and Sexual Activity    Alcohol use: No     Comment: quit 2008    Drug use: No    Sexual activity: Yes   Lifestyle    Physical activity     Days per week: Not on file     Minutes per session: Not on file    Stress: Not on file   Relationships    Social connections     Talks on phone: Not on file     Gets together: Not on file Effort: Pulmonary effort is normal. No respiratory distress. Breath sounds: No wheezing. Abdominal:      General: Bowel sounds are normal.      Palpations: Abdomen is soft. Musculoskeletal: Normal range of motion. Skin:     General: Skin is dry. Coloration: Skin is not jaundiced. Neurological:      Mental Status: He is alert. Assessment/Plan  Juhi Lepe was seen today for health maintenance, medication refill and sinus problem. Diagnoses and all orders for this visit:    Sinusitis, unspecified chronicity, unspecified location  -     azithromycin (ZITHROMAX) 250 MG tablet; Take 1 tablet by mouth See Admin Instructions for 5 days 500mg on day 1 followed by 250mg on days 2 - 5    Anxiety  -     busPIRone (BUSPAR) 10 MG tablet; Take 1 tablet by mouth 3 times daily    DM type 2 with diabetic dyslipidemia (HCC)  -     Hemoglobin A1C; Future        No follow-ups on file.     Nataliia Sotelo MD

## 2021-02-03 ENCOUNTER — TELEPHONE (OUTPATIENT)
Dept: PRIMARY CARE CLINIC | Age: 56
End: 2021-02-03

## 2021-02-05 ENCOUNTER — TELEPHONE (OUTPATIENT)
Dept: PRIMARY CARE CLINIC | Age: 56
End: 2021-02-05

## 2021-02-05 ENCOUNTER — VIRTUAL VISIT (OUTPATIENT)
Dept: PRIMARY CARE CLINIC | Age: 56
End: 2021-02-05
Payer: COMMERCIAL

## 2021-02-05 DIAGNOSIS — R05.9 COUGH: ICD-10-CM

## 2021-02-05 DIAGNOSIS — U07.1 COVID-19 VIRUS INFECTION: Primary | ICD-10-CM

## 2021-02-05 DIAGNOSIS — R51.9 ACUTE NONINTRACTABLE HEADACHE, UNSPECIFIED HEADACHE TYPE: ICD-10-CM

## 2021-02-05 PROCEDURE — 99442 PR PHYS/QHP TELEPHONE EVALUATION 11-20 MIN: CPT | Performed by: NURSE PRACTITIONER

## 2021-02-05 RX ORDER — BROMPHENIRAMINE MALEATE, PSEUDOEPHEDRINE HYDROCHLORIDE, AND DEXTROMETHORPHAN HYDROBROMIDE 2; 30; 10 MG/5ML; MG/5ML; MG/5ML
5 SYRUP ORAL 4 TIMES DAILY PRN
Qty: 140 ML | Refills: 0 | Status: SHIPPED | OUTPATIENT
Start: 2021-02-05 | End: 2021-02-12

## 2021-02-05 RX ORDER — BENZONATATE 100 MG/1
100 CAPSULE ORAL 3 TIMES DAILY PRN
Qty: 21 CAPSULE | Refills: 0 | Status: SHIPPED | OUTPATIENT
Start: 2021-02-05 | End: 2021-02-12

## 2021-02-05 ASSESSMENT — ENCOUNTER SYMPTOMS
SORE THROAT: 0
COUGH: 1
APNEA: 0
BLURRED VISION: 0
WHEEZING: 0
HEMOPTYSIS: 0
VOMITING: 0
RHINORRHEA: 0
SINUS PRESSURE: 0
NAUSEA: 0
EYE REDNESS: 0
SHORTNESS OF BREATH: 0
SWOLLEN GLANDS: 0
ABDOMINAL PAIN: 0
TROUBLE SWALLOWING: 0
VISUAL CHANGE: 0
PHOTOPHOBIA: 0
SCALP TENDERNESS: 0
CHEST TIGHTNESS: 0
ABDOMINAL DISTENTION: 0
DIARRHEA: 0
EYE WATERING: 0
BACK PAIN: 0

## 2021-02-05 NOTE — PATIENT INSTRUCTIONS
Care instructions adapted under license by [facility] ([facility]). If you have questions about a medical condition or this instruction, always ask your healthcare professional. [place] [address] any warranty or liability for your use of this information. Patient Education        Learning About Coronavirus [phone])  What is coronavirus (COVID-19)? COVID-19 is a disease caused by a new type of coronavirus. This illness was first found in December 2019. It has since spread worldwide. Coronaviruses are a large group of viruses. They cause the common cold. They also cause more serious illnesses like [place] East respiratory syndrome (MERS) and severe acute respiratory syndrome (SARS). COVID-19 is caused by a novel coronavirus. That means it's a new type that has not been seen in people before. What are the symptoms? Coronavirus (COVID-19) symptoms may include:  · Fever. · Cough. · Trouble breathing. · Chills or repeated shaking with chills. · Muscle pain. · Headache. · Sore throat. · New loss of taste or smell. · Vomiting. · Diarrhea. In severe cases, COVID-19 can cause pneumonia and make it hard to breathe without help from a machine. It can cause death. How is it diagnosed? COVID-19 is diagnosed with a viral test. This may also be called a PCR test or antigen test. It looks for evidence of the virus in your breathing passages or lungs (respiratory system). The test is most often done on a sample from the nose, throat, or lungs. It's sometimes done on a sample of saliva. One way a sample is collected is by putting a long swab into the back of your nose. How is it treated? Mild cases of COVID-19 can be treated at home. Serious cases need treatment in the hospital. Treatment may include medicines to reduce symptoms, plus breathing support such as oxygen therapy or a ventilator. Some people may be placed on their belly to help their oxygen levels. Treatments that may help people who have COVID-19 include:  Antiviral medicines. These medicines treat viral infections. Remdesivir is an example. Immune-based therapy. These medicines help the immune system fight COVID-19. One example is bamlanivimab. It's a monoclonal antibody. Blood thinners. These medicines help prevent blood clots. People with severe illness are at risk for blood clots. How can you protect yourself and others? The best way to protect yourself from getting sick is to:  · Avoid areas where there is an outbreak. · Avoid contact with people who may be infected. · Avoid crowds and try to stay at least 6 feet away from other people. · Wash your hands often, especially after you cough or sneeze. Use soap and water, and scrub for at least 20 seconds. If soap and water aren't available, use an alcohol-based hand . · Avoid touching your mouth, nose, and eyes. To help avoid spreading the virus to others:  · Stay home if you are sick or have been exposed to the virus. Don't go to school, work, or public areas. And don't use public transportation, ride-shares, or taxis unless you have no choice. · Wear a cloth face cover if you have to go to public areas. · Cover your mouth with a tissue when you cough or sneeze. Then throw the tissue in the trash and wash your hands right away. · If you're sick:  ? Leave your home only if you need to get medical care. But call the doctor's office first so they know you're coming. And wear a face cover. ? Wear the face cover whenever you're around other people. It can help stop the spread of the virus when you cough or sneeze. ? Limit contact with pets and people in your home. If possible, stay in a separate bedroom and use a separate bathroom. ? Clean and disinfect your home every day. Use household  and disinfectant wipes or sprays. Take special care to clean things that you grab with your hands. These include doorknobs, remote controls, phones, and handles on your refrigerator and microwave. And don't forget countertops, tabletops, bathrooms, and computer keyboards. When should you call for help? Call 911 anytime you think you may need emergency care. For example, call if you have life-threatening symptoms, such as:    · You have severe trouble breathing. (You can't talk at all.)     · You have constant chest pain or pressure.     · You are severely dizzy or lightheaded.     · You are confused or can't think clearly.     · Your face and lips have a blue color.     · You pass out (lose consciousness) or are very hard to wake up. Call your doctor now or seek immediate medical care if:    · You have moderate trouble breathing. (You can't speak a full sentence.)     · You are coughing up blood (more than about 1 teaspoon).     · You have signs of low blood pressure. These include feeling lightheaded; being too weak to stand; and having cold, pale, clammy skin. Watch closely for changes in your health, and be sure to contact your doctor if:    · Your symptoms get worse.     · You are not getting better as expected. Call before you go to the doctor's office. Follow their instructions. And wear a cloth face cover. Current as of: December 18, 2020               Content Version: 12.7  © 2006-2021 [employer]. Care instructions adapted under license by [facility] ([facility]). If you have questions about a medical condition or this instruction, always ask your healthcare professional. [place] [address] any warranty or liability for your use of this information.        Patient Education        Coronavirus ([id number]): Care Instructions  Overview The coronavirus disease (COVID-19) is caused by a virus. Symptoms may include a fever, a cough, and shortness of breath. It mainly spreads person-to-person through droplets from coughing and sneezing. The virus also can spread when people are in close contact with someone who is infected. Most people have mild symptoms and can take care of themselves at home. If their symptoms get worse, they may need care in a hospital. Treatment may include medicines to reduce symptoms, plus breathing support such as oxygen therapy or a ventilator. It's important to not spread the virus to others. If you have COVID-19, wear a face cover anytime you are around other people. You need to isolate yourself while you are sick. Leave your home only if you need to get medical care or testing. Follow-up care is a key part of your treatment and safety. Be sure to make and go to all appointments, and call your doctor if you are having problems. It's also a good idea to know your test results and keep a list of the medicines you take. How can you care for yourself at home? · Get extra rest. It can help you feel better. · Drink plenty of fluids. This helps replace fluids lost from fever. Fluids also help ease a scratchy throat. Water, soup, fruit juice, and hot tea with lemon are good choices. · Take acetaminophen (such as Tylenol) to reduce a fever. It may also help with muscle aches. Read and follow all instructions on the label. · Use petroleum jelly on sore skin. This can help if the skin around your nose and lips becomes sore from rubbing a lot with tissues. Tips for self-isolation  · Limit contact with people in your home. If possible, stay in a separate bedroom and use a separate bathroom. · Wear a cloth face cover when you are around other people. It can help stop the spread of the virus when you cough or sneeze. · If you have to leave home, avoid crowds and try to stay at least 6 feet away from other people. · Avoid contact with pets and other animals. · Cover your mouth and nose with a tissue when you cough or sneeze. Then throw it in the trash right away. · Wash your hands often, especially after you cough or sneeze. Use soap and water, and scrub for at least 20 seconds. If soap and water aren't available, use an alcohol-based hand . · Don't share personal household items. These include bedding, towels, cups and glasses, and eating utensils. · [address] in the warmest water allowed for the fabric type, and dry it completely. It's okay to wash other people's laundry with yours. · Clean and disinfect your home every day. Use household  and disinfectant wipes or sprays. Take special care to clean things that you grab with your hands. These include doorknobs, remote controls, phones, and handles on your refrigerator and microwave. And don't forget countertops, tabletops, bathrooms, and computer keyboards. When you can end self-isolation  · If you know or suspect that you have COVID-19, stay in self-isolation until:  ? You haven't had a fever for 24 hours while not taking medicines to lower the fever, and  ? Your symptoms have improved, and  ? It's been at least 10 days since your symptoms started. · Talk to your doctor about whether you also need testing, especially if you have a weakened immune system. When should you call for help? Call 911 anytime you think you may need emergency care. For example, call if you have life-threatening symptoms, such as:    · You have severe trouble breathing. (You can't talk at all.)     · You have constant chest pain or pressure.     · You are severely dizzy or lightheaded.     · You are confused or can't think clearly.     · Your face and lips have a blue color.     · You pass out (lose consciousness) or are very hard to wake up.    Call your doctor now or seek immediate medical care if:   · You have moderate trouble breathing. (You can't speak a full sentence.)     · You are coughing up blood (more than about 1 teaspoon).     · You have signs of low blood pressure. These include feeling lightheaded; being too weak to stand; and having cold, pale, clammy skin. Watch closely for changes in your health, and be sure to contact your doctor if:    · Your symptoms get worse.     · You are not getting better as expected. Call before you go to the doctor's office. Follow their instructions. And wear a cloth face cover. Current as of: December 18, 2020               Content Version: 12.7  © 2006-2021 [employer]. Care instructions adapted under license by [facility] ([facility]). If you have questions about a medical condition or this instruction, always ask your healthcare professional. [place] [address] any warranty or liability for your use of this information. Patient Education        Cough: Care Instructions  Your Care Instructions     A cough is your body's response to something that bothers your throat or airways. Many things can cause a cough. You might cough because of a cold or the flu, bronchitis, or asthma. Smoking, postnasal drip, allergies, and stomach acid that backs up into your throat also can cause coughs. A cough is a symptom, not a disease. Most coughs stop when the cause, such as a cold, goes away. You can take a few steps at home to cough less and feel better. Follow-up care is a key part of your treatment and safety. Be sure to make and go to all appointments, and call your doctor if you are having problems. It's also a good idea to know your test results and keep a list of the medicines you take. How can you care for yourself at home? · Drink lots of water and other fluids. This helps thin the mucus and soothes a dry or sore throat. Honey or lemon juice in hot water or tea may ease a dry cough. · Take cough medicine as directed by your doctor. · Prop up your head on pillows to help you breathe and ease a dry cough. · Try cough drops to soothe a dry or sore throat. Cough drops don't stop a cough. Medicine-flavored cough drops are no better than candy-flavored drops or hard candy. · Do not smoke. Avoid secondhand smoke. If you need help quitting, talk to your doctor about stop-smoking programs and medicines. These can increase your chances of quitting for good. When should you call for help? Call 911 anytime you think you may need emergency care. For example, call if:    · You have severe trouble breathing. Call your doctor now or seek immediate medical care if:    · You cough up blood.     · You have new or worse trouble breathing.     · You have a new or higher fever.     · You have a new rash. Watch closely for changes in your health, and be sure to contact your doctor if:    · You cough more deeply or more often, especially if you notice more mucus or a change in the color of your mucus.     · You have new symptoms, such as a sore throat, an earache, or sinus pain.     · You do not get better as expected. Where can you learn more? Go to https://[employer]. org and sign in to your [employer] account. Enter D279 in the [employer] box to learn more about \"Cough: Care Instructions. \"     If you do not have an account, please click on the \"Sign Up Now\" link. Current as of: February 24, 2020               Content Version: 12.6  © 2006-2020 [employer]. Care instructions adapted under license by [facility] ([facility]). If you have questions about a medical condition or this instruction, always ask your healthcare professional. [place] [address] any warranty or liability for your use of this information.

## 2021-02-05 NOTE — PROGRESS NOTES
2/5/2021    Pt completed a telephone visit today. Pt was at his home and provider was in her office. Pt is an established pt with his PCP, Dr. Darshan Cabrera -- Audio/Visual (During [id number] public health emergency)    HPI:    PT IS COVID POSITIVE AS OF THIS PAST WEDNESDAY HE TOOK A TEST AT [facility]. Cough  This is a new problem. The current episode started in the past 7 days (since Monday). The problem has been gradually worsening. The problem occurs every few minutes. The cough is non-productive. Associated symptoms include headaches (pt denies it is thunderclap in appearance or the worst HA but reports it is getting worse when he coughs and he is in more pain.), myalgias and postnasal drip. Pertinent negatives include no chest pain, chills, ear pain, eye redness, fever, hemoptysis, nasal congestion, rash, rhinorrhea, sore throat, shortness of breath or wheezing. Nothing aggravates the symptoms. There is no history of asthma, bronchitis, emphysema or pneumonia. Headache   This is a new problem. The current episode started in the past 7 days (x couple days). The problem occurs intermittently. The problem has been unchanged. The quality of the pain is described as aching and dull. The pain is moderate. Associated symptoms include coughing and muscle aches. Pertinent negatives include no abdominal pain, abnormal behavior, back pain, blurred vision, dizziness, drainage, ear pain, eye redness, eye watering, fever, hearing loss, loss of balance, nausea, numbness, phonophobia, photophobia, rhinorrhea, scalp tenderness, sinus pressure, sore throat, swollen glands, tingling, visual change, vomiting or weakness. The symptoms are aggravated by coughing. He has tried acetaminophen for the symptoms. The treatment provided no relief. There is no history of cancer, hypertension, immunosuppression, migraine headaches, obesity, pseudotumor cerebri, sinus disease or TMJ.    Other This is a new (Pt was tested and + for Covid this past wednesday at an urgent care clinic) problem. The problem has been unchanged. Associated symptoms include coughing, fatigue, headaches (pt denies it is thunderclap in appearance or the worst HA but reports it is getting worse when he coughs and he is in more pain.) and myalgias. Pertinent negatives include no abdominal pain, chest pain, chills, congestion, fever, nausea, numbness, rash, sore throat, swollen glands, visual change, vomiting or weakness. The symptoms are aggravated by coughing. He has tried rest for the symptoms. Pt reports he started with a cough on Monday and pt reports he went to an Urgent care in [facility]  and was tested on Wednesday for Covid and the test came back POSITIVE. Pt reports his s/s before getting tested were he was  having dizziness and he loss of sense of smell, fever, body aches and cough. Pt reports cough is getting worse and requesting some meds for his cough today. A prescription for cough syrup and tessalon pearls were sent today and pt also requesting something for his headaches but I advised to use 800 mg of Ibuprofen or Tylenol as needed for the headache but pt would like something stronger so I advised pt that we will send a message to the pt as he would like called and given some advice. Kathryn Kowalski (:  1965) has requested an audio/video evaluation for the following concern(s):    Chief Complaint   Patient presents with    Cough     started monday and getting worse, not productive    Headache     pt reports the headache hurts worse whn he coughs    Other     + for Covid after pt reports he was tested Wednesday at an urgent care in [facility]          Review of Systems   Constitutional: Positive for fatigue. Negative for chills and fever. HENT: Positive for postnasal drip. Negative for congestion, drooling, ear pain, hearing loss, rhinorrhea, sinus pressure, sore throat and trouble swallowing. Eyes: Negative for blurred vision, photophobia and redness. Respiratory: Positive for cough. Negative for apnea, hemoptysis, chest tightness, shortness of breath and wheezing. Cardiovascular: Negative for chest pain. Gastrointestinal: Negative for abdominal distention, abdominal pain, diarrhea, nausea and vomiting. Genitourinary: Negative for dysuria, frequency and urgency. Musculoskeletal: Positive for myalgias. Negative for back pain. Skin: Negative for rash. Neurological: Positive for headaches (pt denies it is thunderclap in appearance or the worst HA but reports it is getting worse when he coughs and he is in more pain. ). Negative for dizziness, tingling, weakness, numbness and loss of balance. Hematological: Negative for adenopathy. Psychiatric/Behavioral: Negative for confusion. All other systems reviewed and are negative. Prior to Visit Medications    Medication Sig Taking?  Authorizing Provider   brompheniramine-pseudoephedrine-DM 2-30-10 MG/5ML syrup Take 5 mLs by mouth 4 times daily as needed for Congestion or Cough Yes JADIEL Mercado CNP   benzonatate (TESSALON) 100 MG capsule Take 1 capsule by mouth 3 times daily as needed for Cough Yes JADIEL Mercado CNP   busPIRone (BUSPAR) 10 MG tablet Take 1 tablet by mouth 3 times daily  Tatianna Horn MD   azithromycin (ZITHROMAX) 250 MG tablet Take 1 tablet by mouth See Admin Instructions for 5 days 500mg on day 1 followed by 250mg on days 2 - 56 Avis Ontiveros MD   metFORMIN (GLUCOPHAGE) 1000 MG tablet Take 1 tablet by mouth 2 times daily (with meals)  Tatianna Horn MD   albuterol sulfate  (90 Base) MCG/ACT inhaler Inhale 2 puffs into the lungs every 6 hours as needed for [address] MD Karla   albuterol (PROVENTIL) (2.5 MG/3ML) 0.083% nebulizer solution Take 3 mLs by nebulization every 4 hours as needed for Wheezing  Tatianna Horn MD mometasone-formoterol (DULERA) 200-5 MCG/ACT inhaler Inhale 2 puffs into the lungs 2 times daily  Shannon Little MD   aspirin 81 MG chewable tablet Take 1 tablet by mouth daily  Shannon Little MD   Cholecalciferol (VITAMIN D) 50 MCG (2000 UT) CAPS capsule Take 1 capsule by mouth daily  Shannon Little MD   atorvastatin (LIPITOR) 10 MG tablet Take 1 tablet by mouth daily  Shannon Little MD   metFORMIN (GLUCOPHAGE) 500 MG tablet Take 1 tablet by mouth 2 times daily (with meals)  Patient taking differently: Take 1,000 mg by mouth 2 times daily (with meals)   Shannon Little MD   Loratadine (CLARITIN PO) Take by mouth  Historical Provider, MD   Naproxen (NAPROSYN PO) Take by mouth  Historical Provider, MD       Social History     Tobacco Use    Smoking status: Never Smoker    Smokeless tobacco: Never Used   Substance Use Topics    Alcohol use: No     Comment: quit 2008    Drug use: No        Allergies   Allergen Reactions    Seasonal    ,   Past Medical History:   Diagnosis Date    Abnormal finding on EKG 2/6/2019    Atrial fibrillation ([address]     Chest pain     stress test neg    Class 1 obesity due to excess calories without serious comorbidity with body mass index (BMI) of 31.0 to 31.9 in adult 12/3/2019    Diabetes mellitus ([address]     Dyslipidemia 1/28/2017    ETOH abuse     quit, he is a counsular, [facility] Hypertension     MI, old 2007    Other chest pain 2/6/2019    Reactive airway disease     Recovering alcoholic in remission ([address] 1/28/2017    Vicodin use disorder, mild ([address]     quit   ,   Past Surgical History:   Procedure Laterality Date    APPENDECTOMY  1982    CARDIAC CATHETERIZATION  2007    COLONOSCOPY N/A 11/8/2019    COLORECTAL CANCER SCREENING, NOT HIGH RISK performed by Nina Daly MD at [address] Right     left 2002 right 2010   ,   Social History     Tobacco Use    Smoking status: Never Smoker    Smokeless tobacco: Never Used Substance Use Topics    Alcohol use: No     Comment: quit 2008    Drug use: No   ,   Family History   Problem Relation Age of Onset    Heart Disease Mother     Stroke Mother     Diabetes Mother     Heart Attack Mother     Hypertension Mother     High Cholesterol Mother     High Blood Pressure Father     High Cholesterol Father     Cancer Father    ,   Immunization History   Administered Date(s) Administered    Influenza, MDCK Quadv, IM, PF (Flucelvax 4 yrs and older) 10/28/2020    Pneumococcal Polysaccharide ([id number]) 03/10/2020    Zoster Recombinant (Shingrix) 03/10/2020       PHYSICAL EXAMINATION:  [ INSTRUCTIONS:  \"[x]\" Indicates a positive item  \"[]\" Indicates a negative item  -- DELETE ALL ITEMS NOT EXAMINED]  Vital Signs: (As obtained by patient/caregiver or practitioner observation)    Blood pressure-  Heart rate-    Respiratory rate-    Temperature- 97f  Pulse oximetry-     Pt was unable to provide me with any other VS today besides temp, denies any fever, chills at this time. Mental status  [x] Alert and awake  [x] Oriented to person/place/time [x]Able to follow commands       PT DENIES ANY WHEEZING, TROUBLE BREATHING, ACUTE DISTRESS AT THIS TIME. Pt was advised if at any time his s/s worsen and he has trouble breathing, swallowing he needs to call 911/go to the ER. Pt verbalized understanding. Pt is on self quarantine at this time for testing + for Covid and verbalized understanding of the two medications and that he can get the CXR done after he has completed his quarantine. Pt denies any wheezing but advised he can use the inhaler as needed if he gets into a coughing fit. Pt verbalized understanding. ASSESSMENT/PLAN:  1. Cough    - brompheniramine-pseudoephedrine-DM 2-30-10 MG/5ML syrup;  Take 5 mLs by mouth 4 times daily as needed for Congestion or Cough  Dispense: 140 mL; Refill: 0

## 2021-02-05 NOTE — TELEPHONE ENCOUNTER
Patient was seen by RACHEL Angel today. He would like you to call him by the end of day today to discuss pain options for his headache due to COVID positive and cough. Patient and wife were advised of NSAID options by NP but patient requesting something stronger.

## 2021-02-08 ENCOUNTER — TELEPHONE (OUTPATIENT)
Dept: PRIMARY CARE CLINIC | Age: 56
End: 2021-02-08

## 2021-02-08 DIAGNOSIS — R05.9 COUGH: ICD-10-CM

## 2021-02-08 DIAGNOSIS — U07.1 COVID-19 VIRUS INFECTION: ICD-10-CM

## 2021-02-08 NOTE — TELEPHONE ENCOUNTER
He needs a work note stating when he can return to work? His COVID 19 test is scanned in under media. It looks like he tested positive 2/3/21. He needs to know how long he has to be off? He went to [facility] in [place] and they said he has pneumonia after having a chest xray?

## 2021-02-09 ENCOUNTER — VIRTUAL VISIT (OUTPATIENT)
Dept: PRIMARY CARE CLINIC | Age: 56
End: 2021-02-09

## 2021-02-09 ENCOUNTER — TELEPHONE (OUTPATIENT)
Dept: PRIMARY CARE CLINIC | Age: 56
End: 2021-02-09

## 2021-02-09 DIAGNOSIS — Z91.199 NO-SHOW FOR APPOINTMENT: Primary | ICD-10-CM

## 2021-02-09 NOTE — TELEPHONE ENCOUNTER
Spoke to his wife Sera and they will call back when they need the return to work letters.  Her  has been admitted to the hospital.

## 2021-02-11 ENCOUNTER — TELEPHONE (OUTPATIENT)
Dept: PRIMARY CARE CLINIC | Age: 56
End: 2021-02-11

## 2021-02-11 NOTE — TELEPHONE ENCOUNTER
Called to check up on [facility] and see how he was doing as his CXR came through and he has viral pneumonia but pt was already aware and he went to the ER 2 days ago and he is admitted [facility] and being treated with IV ATB's.

## 2021-02-13 ASSESSMENT — ENCOUNTER SYMPTOMS
SHORTNESS OF BREATH: 0
TROUBLE SWALLOWING: 0
CHOKING: 0
SORE THROAT: 1
VOICE CHANGE: 0
VOMITING: 0
SINUS PRESSURE: 1
PHOTOPHOBIA: 0
NAUSEA: 0

## 2021-04-08 ENCOUNTER — TELEPHONE (OUTPATIENT)
Dept: BEHAVIORAL/MENTAL HEALTH CLINIC | Age: 56
End: 2021-04-08

## 2021-04-08 NOTE — TELEPHONE ENCOUNTER
Left message to let patient know I was calling regarding letter they should have received about Dr Graham Martins leaving the practice and that we wanted to transition their care with another psychologist at [id number] [facility]. Left the number [phone] for pt to call back.

## 2021-09-07 DIAGNOSIS — E78.00 PURE HYPERCHOLESTEROLEMIA: ICD-10-CM

## 2021-09-09 RX ORDER — ATORVASTATIN CALCIUM 10 MG/1
TABLET, FILM COATED ORAL
Qty: 30 TABLET | Refills: 0 | Status: SHIPPED | OUTPATIENT
Start: 2021-09-09

## 2021-11-12 DIAGNOSIS — J45.20 MILD INTERMITTENT ASTHMA, UNSPECIFIED WHETHER COMPLICATED: ICD-10-CM

## 2021-11-15 RX ORDER — ALBUTEROL SULFATE 90 UG/1
AEROSOL, METERED RESPIRATORY (INHALATION)
Qty: 18 G | Refills: 0 | Status: SHIPPED | OUTPATIENT
Start: 2021-11-15

## 2023-12-15 ENCOUNTER — OFFICE VISIT (OUTPATIENT)
Dept: ORTHOPEDIC SURGERY | Facility: CLINIC | Age: 58
End: 2023-12-15
Payer: COMMERCIAL

## 2023-12-15 VITALS — BODY MASS INDEX: 28 KG/M2 | WEIGHT: 200 LBS | HEIGHT: 71 IN

## 2023-12-15 DIAGNOSIS — M23.91 ACUTE INTERNAL DERANGEMENT OF RIGHT KNEE: ICD-10-CM

## 2023-12-15 PROCEDURE — 99214 OFFICE O/P EST MOD 30 MIN: CPT | Performed by: INTERNAL MEDICINE

## 2023-12-15 PROCEDURE — 2500000005 HC RX 250 GENERAL PHARMACY W/O HCPCS: Performed by: INTERNAL MEDICINE

## 2023-12-15 PROCEDURE — 2500000004 HC RX 250 GENERAL PHARMACY W/ HCPCS (ALT 636 FOR OP/ED): Performed by: INTERNAL MEDICINE

## 2023-12-15 PROCEDURE — 20611 DRAIN/INJ JOINT/BURSA W/US: CPT | Mod: RT | Performed by: INTERNAL MEDICINE

## 2023-12-15 RX ORDER — LIDOCAINE HYDROCHLORIDE 10 MG/ML
6 INJECTION INFILTRATION; PERINEURAL
Status: COMPLETED | OUTPATIENT
Start: 2023-12-15 | End: 2023-12-15

## 2023-12-15 RX ORDER — BETAMETHASONE SODIUM PHOSPHATE AND BETAMETHASONE ACETATE 3; 3 MG/ML; MG/ML
2 INJECTION, SUSPENSION INTRA-ARTICULAR; INTRALESIONAL; INTRAMUSCULAR; SOFT TISSUE
Status: COMPLETED | OUTPATIENT
Start: 2023-12-15 | End: 2023-12-15

## 2023-12-15 RX ADMIN — LIDOCAINE HYDROCHLORIDE 6 ML: 10 INJECTION, SOLUTION INFILTRATION; PERINEURAL at 10:57

## 2023-12-15 RX ADMIN — BETAMETHASONE ACETATE AND BETAMETHASONE SODIUM PHOSPHATE 2 ML: 3; 3 INJECTION, SUSPENSION INTRA-ARTICULAR; INTRALESIONAL; INTRAMUSCULAR; SOFT TISSUE at 10:57

## 2023-12-15 NOTE — PROGRESS NOTES
CC:   Chief Complaint   Patient presents with    Right Knee - Pain     Rt Knee Pain - X-Rays @ [facility] - Dog ran into lateral knee 1 yr, carrying plywood 12/10/23, dog ran into Pt's l;eg again        HPI: Pankaj is a 58 y.o. male presents with acute on chronic right knee pain.  Initially saw him in August of last year, he had an MRI done which did reveal a complex tear of the medial meniscus.  He followed up with the [facility], at that time they recommended bracing and physical therapy and anti-inflammatories.  His symptoms never improved, and reaggravated his knee after his dog ran over his knee, and now has increased pain and instability.        Review of Systems   GENERAL: Negative for malaise, significant weight loss, fever  MUSCULOSKELETAL: See HPI  NEURO:  Negative for numbness / tingling     Past Medical History  No past medical history on file.    Medication review  Medication Documentation Review Audit    **Prior to Admission medications have not yet been reviewed**         Allergies  Allergies   Allergen Reactions    Trazodone Other       Social History  Social History     Socioeconomic History    Marital status: Not on file     Spouse name: Not on file    Number of children: Not on file    Years of education: Not on file    Highest education level: Not on file   Occupational History    Not on file   Tobacco Use    Smoking status: Never    Smokeless tobacco: Never   Substance and Sexual Activity    Alcohol use: Not on file    Drug use: Not on file    Sexual activity: Not on file   Other Topics Concern    Not on file   Social History Narrative    Not on file     Social Determinants of Health     Financial Resource Strain: Not on file   Food Insecurity: Not on file   Transportation Needs: Not on file   Physical Activity: Not on file   Stress: Not on file   Social Connections: Not on file   Intimate Partner Violence: Not on file   Housing Stability: Not on file       Surgical History  No past surgical  history on file.    Physical Exam:  GENERAL:  Patient is awake, alert, and oriented to person place and time.  Patient appears well nourished and well kept.  Affect Calm, Not Acutely Distressed.  HEENT:  Normocephalic, Atraumatic, EOMI  CARDIOVASCULAR:  Hemodynamically stable.  RESPIRATORY:  Normal respirations with unlabored breathing.  Extremity: Right knee shows trace to 1+ effusion the right knee.  He can flex her right knee to 120 degrees with pain.  Full extension at 0 degrees.  Negative valgus and varus stress test.  Positive Kathy's test medially with instability.  Negative Lachman's test.  Patellar and quadricep mechanism intact.  He can do a satisfactory straight leg test.  Negative anterior and posterior drawer test.  Left knee was examined for comparison.      Diagnostics: X-rays MRIs reviewed.        Procedure: L Inj/Asp: R knee on 12/15/2023 10:57 AM  Indications: pain  Details: 22 G needle, ultrasound-guided lateral approach  Medications: 2 mL betamethasone acet,sod phos 6 mg/mL; 6 mL lidocaine 10 mg/mL (1 %)  Outcome: tolerated well, no immediate complications  Procedure, treatment alternatives, risks and benefits explained, specific risks discussed. Consent was given by the patient. Immediately prior to procedure a time out was called to verify the correct patient, procedure, equipment, support staff and site/side marked as required. Patient was prepped and draped in the usual sterile fashion.             Assessment:  1.  Right knee internal derangement  2.  Right knee medial meniscal tear    Plan: Pankaj presents with acute on chronic right knee pain and instability secondary to internal derangement and complex tear of the medial meniscus.  He had MRI of the right knee in August 2022, and since then he has reinjured his knee and has increased pain and instability.  We do recommend MRI of the right knee to evaluate for his complex tearing of the medial meniscus and internal derangement.  We did  offer a ultrasound-guided corticosteroid into the right knee joint, risk and benefits of the procedure discussed with the patient like to go forward with injection today.  After the injection he had improvement of his pain and symptoms.  He will follow-up with one my partners after the MRI.  We also discussed to closely monitor his diabetes after the injection.    Orders Placed This Encounter    Point of Care Ultrasound    MR knee right wo IV contrast      At the conclusion of the visit there were no further questions by the patient/family regarding their plan of care.  Patient was instructed to call or return with any issues, questions, or concerns regarding their injury and/or treatment plan described above.     12/15/23 at 10:51 AM - Remberto Mccray MD    Office: [phone]    This note was prepared using voice recognition software.  The details of this note are correct and have been reviewed, and corrected to the best of my ability.  Some grammatical errors may persist related to the Dragon software.

## 2024-01-08 ENCOUNTER — HOSPITAL ENCOUNTER (OUTPATIENT)
Dept: RADIOLOGY | Facility: CLINIC | Age: 59
Discharge: HOME | End: 2024-01-08
Payer: COMMERCIAL

## 2024-01-08 DIAGNOSIS — M23.91 ACUTE INTERNAL DERANGEMENT OF RIGHT KNEE: ICD-10-CM

## 2024-01-08 PROCEDURE — 73721 MRI JNT OF LWR EXTRE W/O DYE: CPT | Mod: RT

## 2024-01-08 PROCEDURE — 73721 MRI JNT OF LWR EXTRE W/O DYE: CPT | Mod: RIGHT SIDE | Performed by: RADIOLOGY

## 2024-01-11 ENCOUNTER — APPOINTMENT (OUTPATIENT)
Dept: ORTHOPEDIC SURGERY | Facility: CLINIC | Age: 59
End: 2024-01-11
Payer: COMMERCIAL

## 2024-01-11 NOTE — PROGRESS NOTES
History of Present Illness   No chief complaint on file.      Patient returns today to review {side:63894} Knee MRI.  The patient endorses persistent knee pain and persistent mechanical symptoms including locking, catching, and giving out.  These issues are refractory to multiple non-surgical treatments.    No past medical history on file.    Medication Documentation Review Audit    **Prior to Admission medications have not yet been reviewed**         Allergies   Allergen Reactions    Trazodone Other       Social History     Socioeconomic History    Marital status:      Spouse name: Not on file    Number of children: Not on file    Years of education: Not on file    Highest education level: Not on file   Occupational History    Not on file   Tobacco Use    Smoking status: Never    Smokeless tobacco: Never   Substance and Sexual Activity    Alcohol use: Not on file    Drug use: Not on file    Sexual activity: Not on file   Other Topics Concern    Not on file   Social History Narrative    Not on file     Social Determinants of Health     Financial Resource Strain: Not on file   Food Insecurity: Not on file   Transportation Needs: Not on file   Physical Activity: Not on file   Stress: Not on file   Social Connections: Not on file   Intimate Partner Violence: Not on file   Housing Stability: Not on file       No past surgical history on file.      Smoking  {yes,no:80520}    BMI  ***     Review of Systems   GENERAL: Negative for malaise, significant weight loss, fever  MUSCULOSKELETAL: See HPI  NEURO:  Negative for numbness / tingling      Physical Exam  {side:99466} Knee:  Skin healthy and intact  No gross varus or valgus alignment   Range of motion: no major deficits   Tenderness to palpation over joint line: {medial/lateral:36387}  No laxity to valgus stress  No laxity to varus stress  Negative Lachman´s test  Negative anterior drawer test  Negative posterior drawer test  Positive Kathy´s  test  Neurovascular exam normal distally     Imaging  MR knee right wo IV contrast  Narrative: Interpreted By:  Juan Francisco Chapin,   STUDY:  MR KNEE RIGHT WO IV CONTRAST;  1/8/2024 4:26 pm      INDICATION:  Signs/Symptoms:Pain. Chronic right knee pain and instability. History  of previous MRI in August. History of complex medial meniscal tearing.      COMPARISON:  Previous MRI August of 2022 none available for comparison. Subsequent  recurrent right knee injury.      ACCESSION NUMBER(S):  [id number]      ORDERING CLINICIAN:  ABDELRAHMAN JEFFERY      TECHNIQUE:  Multiplanar and multisequential MR images of the right knee are  performed.      FINDINGS:  There is a small joint effusion and tiny Baker's cyst.      There is no sign of acute osseous fracture, bone marrow edema, or  osseous mass. There are mild osteoarthritic changes at the medial  compartment with mild diffuse thinning of the articular cartilage and  joint space narrowing. There is minor subchondral degenerative bone  marrow signal at the posteromedial tibial epiphysis.      The medial meniscus is abnormal. There is truncation at the free edge  of the posterior horn at the middle 3rd extending into the peripheral  3rd. There is complex intermediate to increased T2 weighted signal in  the posterior horn at the middle 3rd and peripheral 3rd with some  linear extension to the superior and inferior articular surfaces.  This appearance extends into the body of the meniscus. At the body of  the meniscus there is horizontal linear signal extending to the free  edge. There is some minor truncation of the free edge. The anterior  horn is intact.      The lateral meniscus is of normal morphology. There is no linear tear  truncation.      The anterior and posterior cruciate ligaments, lateral collateral  ligament complex, popliteus tendon, medial collateral ligament,  extensor complex, and patellar retinacula are grossly intact. There  is minimal edema superficial  to the proximal medial collateral  ligament extending posteriorly along the joint capsule.      The surrounding soft tissues are otherwise unremarkable.      Impression: Mild osteoarthritic changes at the medial compartment.      There is complex linear tearing of the posterior horn and body of the  medial meniscus with some truncation at the free edge of the  posterior horn. These findings are compatible with meniscal tear  though in part be related to previous meniscal debridement or  meniscal repair at this site and should be correlated with the  patient's surgical history and any previous MRIs is made available.      Mild grade 1 sprain of the medial collateral ligament proximally.  There is no focal ligament tear or laxity.      No acute osseous abnormality.      Signed by: Juan Francisco Chapin 1/8/2024 4:43 PM  Dictation workstation:   [id number]         Assessment:    Patient with a {side:07732} knee {medial/lateral:85354} meniscal tear.     Plan:  We discussed arthroscopy versus nonsurgical treatment for the patient´s meniscal tear.  We reviewed the blood supply to the meniscus, limited healing potential and meniscectomy versus meniscal repair.  The risks of the procedure were mentioned, including wound issues, deep venous thrombosis, pulmonary embolism and medical complications.  The anticipated timeframe for recovery and return to activity were outlined.  We discussed formal physical therapy versus home exercise program.     We mentioned the possibility of incomplete relief of pain, particularly if any chondral defects are encountered and the possibility of arthrosis of the knee related to long-term deficiencies of the meniscus.   {treatment options:33653}

## 2024-01-19 ENCOUNTER — OFFICE VISIT (OUTPATIENT)
Dept: ORTHOPEDIC SURGERY | Facility: CLINIC | Age: 59
End: 2024-01-19
Payer: COMMERCIAL

## 2024-01-19 DIAGNOSIS — S83.241A TEAR OF MEDIAL MENISCUS OF RIGHT KNEE, CURRENT, UNSPECIFIED TEAR TYPE, INITIAL ENCOUNTER: Primary | ICD-10-CM

## 2024-01-19 PROCEDURE — 1036F TOBACCO NON-USER: CPT | Performed by: ORTHOPAEDIC SURGERY

## 2024-01-19 PROCEDURE — 99214 OFFICE O/P EST MOD 30 MIN: CPT | Performed by: ORTHOPAEDIC SURGERY

## 2024-01-19 PROCEDURE — 99214 OFFICE O/P EST MOD 30 MIN: CPT | Mod: 57 | Performed by: ORTHOPAEDIC SURGERY

## 2024-01-19 RX ORDER — LISINOPRIL 2.5 MG/1
2.5 TABLET ORAL DAILY
COMMUNITY
Start: 2023-04-28

## 2024-01-19 RX ORDER — METFORMIN HYDROCHLORIDE 500 MG/1
1 TABLET ORAL 2 TIMES DAILY
COMMUNITY
Start: 2019-10-09 | End: 2024-02-27 | Stop reason: WASHOUT

## 2024-01-19 RX ORDER — TERBINAFINE HYDROCHLORIDE 250 MG/1
TABLET ORAL
COMMUNITY
Start: 2023-10-05 | End: 2024-02-27 | Stop reason: WASHOUT

## 2024-01-19 RX ORDER — OLMESARTAN MEDOXOMIL 5 MG/1
5 TABLET ORAL DAILY
COMMUNITY

## 2024-01-19 RX ORDER — MINERAL OIL
180 ENEMA (ML) RECTAL 2 TIMES DAILY
COMMUNITY
Start: 2023-07-17 | End: 2024-02-27 | Stop reason: WASHOUT

## 2024-01-19 RX ORDER — MELOXICAM 15 MG/1
15 TABLET ORAL DAILY
COMMUNITY
End: 2024-02-27 | Stop reason: WASHOUT

## 2024-01-19 RX ORDER — LORATADINE 10 MG/1
10 TABLET ORAL 2 TIMES DAILY
COMMUNITY

## 2024-01-19 RX ORDER — TADALAFIL 20 MG/1
20 TABLET ORAL DAILY PRN
COMMUNITY
Start: 2023-12-12

## 2024-01-19 RX ORDER — DESVENLAFAXINE 100 MG/1
100 TABLET, EXTENDED RELEASE ORAL DAILY
COMMUNITY

## 2024-01-19 RX ORDER — METFORMIN HYDROCHLORIDE 1000 MG/1
1000 TABLET ORAL 2 TIMES DAILY
COMMUNITY

## 2024-01-19 RX ORDER — BUSPIRONE HYDROCHLORIDE 15 MG/1
15 TABLET ORAL 3 TIMES DAILY
COMMUNITY
Start: 2023-12-19

## 2024-01-19 RX ORDER — ATORVASTATIN CALCIUM 20 MG/1
20 TABLET, FILM COATED ORAL DAILY
COMMUNITY

## 2024-01-19 RX ORDER — FLUTICASONE PROPIONATE 50 MCG
2 SPRAY, SUSPENSION (ML) NASAL
COMMUNITY
Start: 2023-07-17

## 2024-01-19 NOTE — PROGRESS NOTES
History of Present Illness   No chief complaint on file.      Patient returns today to review side: right Knee MRI.  The patient endorses persistent knee pain and persistent mechanical symptoms including locking, catching, and giving out.  These issues are refractory to multiple non-surgical treatments.    No past medical history on file.    Medication Documentation Review Audit    **Prior to Admission medications have not yet been reviewed**         Allergies   Allergen Reactions    Trazodone Other       Social History     Socioeconomic History    Marital status:      Spouse name: Not on file    Number of children: Not on file    Years of education: Not on file    Highest education level: Not on file   Occupational History    Not on file   Tobacco Use    Smoking status: Never    Smokeless tobacco: Never   Substance and Sexual Activity    Alcohol use: Not on file    Drug use: Not on file    Sexual activity: Not on file   Other Topics Concern    Not on file   Social History Narrative    Not on file     Social Determinants of Health     Financial Resource Strain: Not on file   Food Insecurity: Not on file   Transportation Needs: Not on file   Physical Activity: Not on file   Stress: Not on file   Social Connections: Not on file   Intimate Partner Violence: Not on file   Housing Stability: Not on file       No past surgical history on file.      Smoking  No    BMI  28     Review of Systems   GENERAL: Negative for malaise, significant weight loss, fever  MUSCULOSKELETAL: See HPI  NEURO:  Negative for numbness / tingling      Physical Exam  side: right Knee:  Skin healthy and intact  No gross varus or valgus alignment   Range of motion: no major deficits   Tenderness to palpation over joint line: medial  No laxity to valgus stress  No laxity to varus stress  Negative Lachman´s test  Negative anterior drawer test  Negative posterior drawer test  Positive Kathy´s test  Neurovascular exam normal distally      Imaging  MR knee right wo IV contrast  Narrative: Interpreted By:  Juan Francisco Chapin,   STUDY:  MR KNEE RIGHT WO IV CONTRAST;  1/8/2024 4:26 pm      INDICATION:  Signs/Symptoms:Pain. Chronic right knee pain and instability. History  of previous MRI in August. History of complex medial meniscal tearing.      COMPARISON:  Previous MRI August of 2022 none available for comparison. Subsequent  recurrent right knee injury.      ACCESSION NUMBER(S):  [id number]      ORDERING CLINICIAN:  ABDELRAHMAN JEFFERY      TECHNIQUE:  Multiplanar and multisequential MR images of the right knee are  performed.      FINDINGS:  There is a small joint effusion and tiny Baker's cyst.      There is no sign of acute osseous fracture, bone marrow edema, or  osseous mass. There are mild osteoarthritic changes at the medial  compartment with mild diffuse thinning of the articular cartilage and  joint space narrowing. There is minor subchondral degenerative bone  marrow signal at the posteromedial tibial epiphysis.      The medial meniscus is abnormal. There is truncation at the free edge  of the posterior horn at the middle 3rd extending into the peripheral  3rd. There is complex intermediate to increased T2 weighted signal in  the posterior horn at the middle 3rd and peripheral 3rd with some  linear extension to the superior and inferior articular surfaces.  This appearance extends into the body of the meniscus. At the body of  the meniscus there is horizontal linear signal extending to the free  edge. There is some minor truncation of the free edge. The anterior  horn is intact.      The lateral meniscus is of normal morphology. There is no linear tear  truncation.      The anterior and posterior cruciate ligaments, lateral collateral  ligament complex, popliteus tendon, medial collateral ligament,  extensor complex, and patellar retinacula are grossly intact. There  is minimal edema superficial to the proximal medial collateral  ligament  extending posteriorly along the joint capsule.      The surrounding soft tissues are otherwise unremarkable.      Impression: Mild osteoarthritic changes at the medial compartment.      There is complex linear tearing of the posterior horn and body of the  medial meniscus with some truncation at the free edge of the  posterior horn. These findings are compatible with meniscal tear  though in part be related to previous meniscal debridement or  meniscal repair at this site and should be correlated with the  patient's surgical history and any previous MRIs is made available.      Mild grade 1 sprain of the medial collateral ligament proximally.  There is no focal ligament tear or laxity.      No acute osseous abnormality.      Signed by: Juan Francisco Chapin 1/8/2024 4:43 PM  Dictation workstation:   [id number]         Assessment:    Patient with a side: right knee medial meniscal tear.     Plan:  We discussed arthroscopy versus nonsurgical treatment for the patient´s meniscal tear.  We reviewed the blood supply to the meniscus, limited healing potential and meniscectomy versus meniscal repair.  The risks of the procedure were mentioned, including wound issues, deep venous thrombosis, pulmonary embolism and medical complications.  The anticipated timeframe for recovery and return to activity were outlined.  We discussed formal physical therapy versus home exercise program.     We mentioned the possibility of incomplete relief of pain, particularly if any chondral defects are encountered and the possibility of arthrosis of the knee related to long-term deficiencies of the meniscus.   He has failed conservative treatment.  He is trying therapy corticosteroid injection as well as bracing.    A knee arthroscopy and partial medial meniscectomy.  I explained procedure along with the recovery.  The risks, benefits alternatives were reviewed.  Questions were answered.      Allergies   Allergen Reactions    Trazodone Other        REVIEW OF SYSTEMS  General: Negative for malaise, significant weight loss, fever  Musculoskeletal: See HPI  Neuro:  Negative for numbness/tingling      Medication Documentation Review Audit    **Prior to Admission medications have not yet been reviewed**         PHYSICAL EXAM  General Appearance/Mental Status: A&Ox3, no apparent distress  HEENT: Normal  Lungs: Clear  Heart: RRR  Abdomen: Soft, nontender  Extremities: Abnormal right knee medial meniscus tear    ONLY TRAMADOL POST OP DUE TO HISTORY OF SUBSTANCE ABUSE!

## 2024-01-25 PROBLEM — S83.241A OTHER TEAR OF MEDIAL MENISCUS, CURRENT INJURY, RIGHT KNEE, INITIAL ENCOUNTER: Status: ACTIVE | Noted: 2024-01-25

## 2024-02-22 ENCOUNTER — TELEPHONE (OUTPATIENT)
Dept: ORTHOPEDIC SURGERY | Facility: CLINIC | Age: 59
End: 2024-02-22
Payer: COMMERCIAL

## 2024-02-22 NOTE — TELEPHONE ENCOUNTER
02/22/24  M for pt re availability of crutches to use following sx.  Asked pt to contact our office and let us know whether he has, or needs crutches for sx.

## 2024-02-23 ENCOUNTER — TELEPHONE (OUTPATIENT)
Dept: ORTHOPEDIC SURGERY | Facility: CLINIC | Age: 59
End: 2024-02-23
Payer: COMMERCIAL

## 2024-02-23 ENCOUNTER — APPOINTMENT (OUTPATIENT)
Dept: ORTHOPEDIC SURGERY | Facility: CLINIC | Age: 59
End: 2024-02-23
Payer: COMMERCIAL

## 2024-02-24 ENCOUNTER — LAB (OUTPATIENT)
Dept: LAB | Facility: LAB | Age: 59
End: 2024-02-24
Payer: COMMERCIAL

## 2024-02-24 DIAGNOSIS — S83.241A OTHER TEAR OF MEDIAL MENISCUS, CURRENT INJURY, RIGHT KNEE, INITIAL ENCOUNTER: ICD-10-CM

## 2024-02-24 LAB
ALBUMIN SERPL BCP-MCNC: 4.8 G/DL (ref 3.4–5)
ALP SERPL-CCNC: 76 U/L (ref 33–136)
ALT SERPL W P-5'-P-CCNC: 47 U/L (ref 10–52)
ANION GAP SERPL CALC-SCNC: 16 MMOL/L (ref 10–20)
AST SERPL W P-5'-P-CCNC: 22 U/L (ref 9–39)
BASOPHILS # BLD AUTO: 0.06 X10*3/UL (ref 0–0.1)
BASOPHILS NFR BLD AUTO: 0.7 %
BILIRUB SERPL-MCNC: 0.5 MG/DL (ref 0–1.2)
BUN SERPL-MCNC: 19 MG/DL (ref 6–23)
CALCIUM SERPL-MCNC: 9.6 MG/DL (ref 8.6–10.3)
CHLORIDE SERPL-SCNC: 102 MMOL/L (ref 98–107)
CO2 SERPL-SCNC: 25 MMOL/L (ref 21–32)
CREAT SERPL-MCNC: 1.06 MG/DL (ref 0.5–1.3)
EGFRCR SERPLBLD CKD-EPI 2021: 80 ML/MIN/1.73M*2
EOSINOPHIL # BLD AUTO: 0.22 X10*3/UL (ref 0–0.7)
EOSINOPHIL NFR BLD AUTO: 2.6 %
ERYTHROCYTE [DISTWIDTH] IN BLOOD BY AUTOMATED COUNT: 13.1 % (ref 11.5–14.5)
GLUCOSE SERPL-MCNC: 194 MG/DL (ref 74–99)
HCT VFR BLD AUTO: 46.1 % (ref 41–52)
HGB BLD-MCNC: 15.4 G/DL (ref 13.5–17.5)
IMM GRANULOCYTES # BLD AUTO: 0.06 X10*3/UL (ref 0–0.7)
IMM GRANULOCYTES NFR BLD AUTO: 0.7 % (ref 0–0.9)
INR PPP: 1.1 (ref 0.9–1.1)
LYMPHOCYTES # BLD AUTO: 2.26 X10*3/UL (ref 1.2–4.8)
LYMPHOCYTES NFR BLD AUTO: 26.5 %
MCH RBC QN AUTO: 31.4 PG (ref 26–34)
MCHC RBC AUTO-ENTMCNC: 33.4 G/DL (ref 32–36)
MCV RBC AUTO: 94 FL (ref 80–100)
MONOCYTES # BLD AUTO: 0.37 X10*3/UL (ref 0.1–1)
MONOCYTES NFR BLD AUTO: 4.3 %
NEUTROPHILS # BLD AUTO: 5.55 X10*3/UL (ref 1.2–7.7)
NEUTROPHILS NFR BLD AUTO: 65.2 %
NRBC BLD-RTO: 0 /100 WBCS (ref 0–0)
PLATELET # BLD AUTO: 243 X10*3/UL (ref 150–450)
POTASSIUM SERPL-SCNC: 4 MMOL/L (ref 3.5–5.3)
PROT SERPL-MCNC: 7.1 G/DL (ref 6.4–8.2)
PROTHROMBIN TIME: 11.9 SECONDS (ref 9.8–12.8)
RBC # BLD AUTO: 4.91 X10*6/UL (ref 4.5–5.9)
SODIUM SERPL-SCNC: 139 MMOL/L (ref 136–145)
WBC # BLD AUTO: 8.5 X10*3/UL (ref 4.4–11.3)

## 2024-02-24 PROCEDURE — 36415 COLL VENOUS BLD VENIPUNCTURE: CPT

## 2024-02-24 PROCEDURE — 85610 PROTHROMBIN TIME: CPT

## 2024-02-24 PROCEDURE — 85025 COMPLETE CBC W/AUTO DIFF WBC: CPT

## 2024-02-24 PROCEDURE — 80053 COMPREHEN METABOLIC PANEL: CPT

## 2024-02-27 ENCOUNTER — PREP FOR PROCEDURE (OUTPATIENT)
Dept: ORTHOPEDIC SURGERY | Facility: CLINIC | Age: 59
End: 2024-02-27
Payer: COMMERCIAL

## 2024-02-27 ENCOUNTER — ANESTHESIA EVENT (OUTPATIENT)
Dept: OPERATING ROOM | Facility: HOSPITAL | Age: 59
End: 2024-02-27
Payer: COMMERCIAL

## 2024-02-27 DIAGNOSIS — S83.241A TEAR OF MEDIAL MENISCUS OF RIGHT KNEE, CURRENT, UNSPECIFIED TEAR TYPE, INITIAL ENCOUNTER: Primary | ICD-10-CM

## 2024-02-27 PROBLEM — I10 HTN (HYPERTENSION): Status: ACTIVE | Noted: 2024-02-27

## 2024-02-27 PROBLEM — E11.9 DIABETES MELLITUS, TYPE 2 (MULTI): Status: ACTIVE | Noted: 2024-02-27

## 2024-02-27 PROBLEM — J45.909 ASTHMA (HHS-HCC): Status: ACTIVE | Noted: 2024-02-27

## 2024-02-27 PROBLEM — E78.5 HYPERLIPIDEMIA: Status: ACTIVE | Noted: 2024-02-27

## 2024-02-27 RX ORDER — TRAMADOL HYDROCHLORIDE 50 MG/1
50 TABLET ORAL EVERY 6 HOURS PRN
Qty: 28 TABLET | Refills: 0 | Status: SHIPPED | OUTPATIENT
Start: 2024-02-27 | End: 2024-03-05

## 2024-02-27 SDOH — HEALTH STABILITY: MENTAL HEALTH: CURRENT SMOKER: 0

## 2024-02-27 NOTE — H&P (VIEW-ONLY)
Allergies   Allergen Reactions    Trazodone Other       REVIEW OF SYSTEMS  General: Negative for malaise, significant weight loss, fever  Musculoskeletal: See HPI  Neuro:  Negative for numbness/tingling      Medication Documentation Review Audit    **Prior to Admission medications have not yet been reviewed**         PHYSICAL EXAM  General Appearance/Mental Status: A&Ox3, no apparent distress  HEENT: Normal  Lungs: Clear  Heart: RRR  Abdomen: Soft, nontender  Extremities: Abnormal right knee medial meniscus tear

## 2024-02-27 NOTE — ANESTHESIA PREPROCEDURE EVALUATION
Pankaj Mccloud is a 58 y.o. male here for:    KNEE ARTHROSCOPY ,PARTIAL MEDIAL MENISECTOMY ONLY TRAMODOL POST OP PER [facility], DIABETIC  With Efrain Holliday MD  Pre-Op Diagnosis Codes:     * Other tear of medial meniscus, current injury, right knee, initial encounter [S83.241A]    Relevant Problems   Cardiovascular   (+) HTN (hypertension)   (+) Hyperlipidemia      Endocrine   (+) Diabetes mellitus, type 2 (CMS/HCC)      Pulmonary   (+) Asthma      Musculoskeletal   (+) Other tear of medial meniscus, current injury, right knee, initial encounter       Lab Results   Component Value Date    HGB 15.4 02/24/2024    HCT 46.1 02/24/2024    WBC 8.5 02/24/2024     02/24/2024     02/24/2024    K 4.0 02/24/2024     02/24/2024    CREATININE 1.06 02/24/2024    BUN 19 02/24/2024     EKG 1/2024:  Sinus rhythm with occasional premature ventricular complexes   Otherwise normal ECG     TTE 2021:  CONCLUSIONS:   - Technically difficult exam due to body habitus.   - Exam indication: Post covid symptoms   - The left ventricle is normal in size. Left ventricular systolic function is   normal. EF = 55 ± 5% (2D biplane) Normal left ventricular diastolic function.   - The right ventricle is normal in size. Right ventricular systolic function is   normal.   - Estimated right ventricular systolic pressure is 24 mmHg plus right atrial   pressure. Estimated right atrial pressure is not included as the IVC was not seen.   - The patient has not had a prior [facility] echocardiographic exam for comparison.     Social History     Tobacco Use   Smoking Status Never   Smokeless Tobacco Never       Allergies   Allergen Reactions   • Trazodone Other     Felt 'hung over'   • Lisinopril Cough       Current Outpatient Medications   Medication Instructions   • atorvastatin (LIPITOR) 20 mg, oral, Daily   • busPIRone (BUSPAR) 15 mg, oral, 3 times daily   • desvenlafaxine (PRISTIQ) 100 mg, oral, Daily   • empagliflozin (Jardiance) 10 mg 1 tablet, oral,  Daily with breakfast   • fluticasone (Flonase) 50 mcg/actuation nasal spray 2 sprays, Does not apply, Daily RT   • lisinopril 2.5 mg, oral, Daily   • loratadine (CLARITIN) 10 mg, oral, 2 times daily   • metFORMIN (GLUCOPHAGE) 1,000 mg, oral, 2 times daily   • olmesartan (BENICAR) 5 mg, oral, Daily   • tadalafil (CIALIS) 20 mg, oral, Daily PRN   • traMADol (ULTRAM) 50 mg, oral, Every 6 hours PRN       Past Surgical History:   Procedure Laterality Date   • APPENDECTOMY     • COLONOSCOPY     • ROTATOR CUFF REPAIR      BILATERAL   -  HARDWARE       No family history on file.    NPO Details:  No data recorded    Physical Exam    Airway  Mallampati: III  TM distance: >3 FB     Cardiovascular    Dental        Pulmonary    Abdominal          Anesthesia Plan    History of general anesthesia?: yes  History of complications of general anesthesia?: no    ASA 2     general     The patient is not a current smoker.    intravenous induction   Postoperative administration of opioids is intended.  Trial extubation is planned.  Anesthetic plan and risks discussed with patient.    Plan discussed with CRNA.

## 2024-02-27 NOTE — H&P
Allergies   Allergen Reactions    Trazodone Other       REVIEW OF SYSTEMS  General: Negative for malaise, significant weight loss, fever  Musculoskeletal: See HPI  Neuro:  Negative for numbness/tingling      Medication Documentation Review Audit    **Prior to Admission medications have not yet been reviewed**       HPI: right medial knee pain with mechanical symptoms.    PHYSICAL EXAM  General Appearance/Mental Status: A&Ox3, no apparent distress  HEENT: Normal  Lungs: Clear  Heart: RRR  Abdomen: Soft, nontender  Extremities: Abnormal right knee medial meniscus tear     Assessment: right knee medial meniscus tear  Plan: right knee arthroscopy and medial menisectomy

## 2024-02-28 ENCOUNTER — ANESTHESIA (OUTPATIENT)
Dept: OPERATING ROOM | Facility: HOSPITAL | Age: 59
End: 2024-02-28
Payer: COMMERCIAL

## 2024-02-28 ENCOUNTER — HOSPITAL ENCOUNTER (OUTPATIENT)
Facility: HOSPITAL | Age: 59
Setting detail: OUTPATIENT SURGERY
Discharge: HOME | End: 2024-02-28
Attending: ORTHOPAEDIC SURGERY | Admitting: ORTHOPAEDIC SURGERY
Payer: COMMERCIAL

## 2024-02-28 VITALS
SYSTOLIC BLOOD PRESSURE: 129 MMHG | HEART RATE: 90 BPM | HEIGHT: 71 IN | BODY MASS INDEX: 27.22 KG/M2 | WEIGHT: 194.45 LBS | RESPIRATION RATE: 16 BRPM | TEMPERATURE: 96.4 F | DIASTOLIC BLOOD PRESSURE: 77 MMHG | OXYGEN SATURATION: 95 %

## 2024-02-28 DIAGNOSIS — S83.241A OTHER TEAR OF MEDIAL MENISCUS, CURRENT INJURY, RIGHT KNEE, INITIAL ENCOUNTER: Primary | ICD-10-CM

## 2024-02-28 LAB — GLUCOSE BLD MANUAL STRIP-MCNC: 119 MG/DL (ref 74–99)

## 2024-02-28 PROCEDURE — 2500000004 HC RX 250 GENERAL PHARMACY W/ HCPCS (ALT 636 FOR OP/ED): Performed by: NURSE ANESTHETIST, CERTIFIED REGISTERED

## 2024-02-28 PROCEDURE — 2500000005 HC RX 250 GENERAL PHARMACY W/O HCPCS: Performed by: ORTHOPAEDIC SURGERY

## 2024-02-28 PROCEDURE — 7100000002 HC RECOVERY ROOM TIME - EACH INCREMENTAL 1 MINUTE: Performed by: ORTHOPAEDIC SURGERY

## 2024-02-28 PROCEDURE — 7100000001 HC RECOVERY ROOM TIME - INITIAL BASE CHARGE: Performed by: ORTHOPAEDIC SURGERY

## 2024-02-28 PROCEDURE — 82947 ASSAY GLUCOSE BLOOD QUANT: CPT

## 2024-02-28 PROCEDURE — 2500000004 HC RX 250 GENERAL PHARMACY W/ HCPCS (ALT 636 FOR OP/ED): Performed by: ORTHOPAEDIC SURGERY

## 2024-02-28 PROCEDURE — 2720000007 HC OR 272 NO HCPCS: Performed by: ORTHOPAEDIC SURGERY

## 2024-02-28 PROCEDURE — 3700000001 HC GENERAL ANESTHESIA TIME - INITIAL BASE CHARGE: Performed by: ORTHOPAEDIC SURGERY

## 2024-02-28 PROCEDURE — 7100000009 HC PHASE TWO TIME - INITIAL BASE CHARGE: Performed by: ORTHOPAEDIC SURGERY

## 2024-02-28 PROCEDURE — 2500000005 HC RX 250 GENERAL PHARMACY W/O HCPCS: Performed by: NURSE ANESTHETIST, CERTIFIED REGISTERED

## 2024-02-28 PROCEDURE — 2500000004 HC RX 250 GENERAL PHARMACY W/ HCPCS (ALT 636 FOR OP/ED): Performed by: PHYSICIAN ASSISTANT

## 2024-02-28 PROCEDURE — 3600000009 HC OR TIME - EACH INCREMENTAL 1 MINUTE - PROCEDURE LEVEL FOUR: Performed by: ORTHOPAEDIC SURGERY

## 2024-02-28 PROCEDURE — 3600000004 HC OR TIME - INITIAL BASE CHARGE - PROCEDURE LEVEL FOUR: Performed by: ORTHOPAEDIC SURGERY

## 2024-02-28 PROCEDURE — 7100000010 HC PHASE TWO TIME - EACH INCREMENTAL 1 MINUTE: Performed by: ORTHOPAEDIC SURGERY

## 2024-02-28 PROCEDURE — 29881 ARTHRS KNE SRG MNISECTMY M/L: CPT | Performed by: ORTHOPAEDIC SURGERY

## 2024-02-28 PROCEDURE — 3700000002 HC GENERAL ANESTHESIA TIME - EACH INCREMENTAL 1 MINUTE: Performed by: ORTHOPAEDIC SURGERY

## 2024-02-28 PROCEDURE — A4217 STERILE WATER/SALINE, 500 ML: HCPCS | Performed by: ORTHOPAEDIC SURGERY

## 2024-02-28 RX ORDER — ASPIRIN 81 MG/1
81 TABLET ORAL DAILY
COMMUNITY

## 2024-02-28 RX ORDER — MIDAZOLAM HYDROCHLORIDE 1 MG/ML
INJECTION, SOLUTION INTRAMUSCULAR; INTRAVENOUS AS NEEDED
Status: DISCONTINUED | OUTPATIENT
Start: 2024-02-28 | End: 2024-02-28

## 2024-02-28 RX ORDER — MELOXICAM 15 MG/1
15 TABLET ORAL DAILY
COMMUNITY

## 2024-02-28 RX ORDER — CEFAZOLIN SODIUM 2 G/100ML
2 INJECTION, SOLUTION INTRAVENOUS ONCE
Status: DISCONTINUED | OUTPATIENT
Start: 2024-02-28 | End: 2024-02-28 | Stop reason: HOSPADM

## 2024-02-28 RX ORDER — SODIUM CHLORIDE, SODIUM LACTATE, POTASSIUM CHLORIDE, CALCIUM CHLORIDE 600; 310; 30; 20 MG/100ML; MG/100ML; MG/100ML; MG/100ML
100 INJECTION, SOLUTION INTRAVENOUS CONTINUOUS
Status: DISCONTINUED | OUTPATIENT
Start: 2024-02-28 | End: 2024-02-28 | Stop reason: HOSPADM

## 2024-02-28 RX ORDER — LIDOCAINE HYDROCHLORIDE 20 MG/ML
INJECTION, SOLUTION INFILTRATION; PERINEURAL AS NEEDED
Status: DISCONTINUED | OUTPATIENT
Start: 2024-02-28 | End: 2024-02-28

## 2024-02-28 RX ORDER — OXYCODONE HYDROCHLORIDE 5 MG/1
5 TABLET ORAL EVERY 4 HOURS PRN
Status: CANCELLED | OUTPATIENT
Start: 2024-02-28

## 2024-02-28 RX ORDER — FERROUS SULFATE, DRIED 160(50) MG
1 TABLET, EXTENDED RELEASE ORAL DAILY
COMMUNITY

## 2024-02-28 RX ORDER — FENTANYL CITRATE 50 UG/ML
25 INJECTION, SOLUTION INTRAMUSCULAR; INTRAVENOUS EVERY 5 MIN PRN
Status: DISCONTINUED | OUTPATIENT
Start: 2024-02-28 | End: 2024-02-28 | Stop reason: HOSPADM

## 2024-02-28 RX ORDER — MAGNESIUM L-LACTATE 84 MG
84 TABLET, EXTENDED RELEASE ORAL DAILY
COMMUNITY

## 2024-02-28 RX ORDER — PROPOFOL 10 MG/ML
INJECTION, EMULSION INTRAVENOUS AS NEEDED
Status: DISCONTINUED | OUTPATIENT
Start: 2024-02-28 | End: 2024-02-28

## 2024-02-28 RX ORDER — PHENYLEPHRINE HCL IN 0.9% NACL 1 MG/10 ML
SYRINGE (ML) INTRAVENOUS AS NEEDED
Status: DISCONTINUED | OUTPATIENT
Start: 2024-02-28 | End: 2024-02-28

## 2024-02-28 RX ORDER — ONDANSETRON HYDROCHLORIDE 2 MG/ML
4 INJECTION, SOLUTION INTRAVENOUS ONCE AS NEEDED
Status: DISCONTINUED | OUTPATIENT
Start: 2024-02-28 | End: 2024-02-28 | Stop reason: HOSPADM

## 2024-02-28 RX ORDER — SODIUM CHLORIDE 0.9 G/100ML
IRRIGANT IRRIGATION AS NEEDED
Status: DISCONTINUED | OUTPATIENT
Start: 2024-02-28 | End: 2024-02-28 | Stop reason: HOSPADM

## 2024-02-28 RX ORDER — FENTANYL CITRATE 50 UG/ML
INJECTION, SOLUTION INTRAMUSCULAR; INTRAVENOUS AS NEEDED
Status: DISCONTINUED | OUTPATIENT
Start: 2024-02-28 | End: 2024-02-28

## 2024-02-28 RX ORDER — SODIUM CHLORIDE 9 MG/ML
100 INJECTION, SOLUTION INTRAVENOUS CONTINUOUS
Status: DISCONTINUED | OUTPATIENT
Start: 2024-02-28 | End: 2024-02-28 | Stop reason: HOSPADM

## 2024-02-28 RX ORDER — ONDANSETRON HYDROCHLORIDE 2 MG/ML
INJECTION, SOLUTION INTRAVENOUS AS NEEDED
Status: DISCONTINUED | OUTPATIENT
Start: 2024-02-28 | End: 2024-02-28

## 2024-02-28 RX ORDER — DEXAMETHASONE SODIUM PHOSPHATE 4 MG/ML
INJECTION, SOLUTION INTRA-ARTICULAR; INTRALESIONAL; INTRAMUSCULAR; INTRAVENOUS; SOFT TISSUE AS NEEDED
Status: DISCONTINUED | OUTPATIENT
Start: 2024-02-28 | End: 2024-02-28

## 2024-02-28 RX ORDER — KETOROLAC TROMETHAMINE 30 MG/ML
INJECTION, SOLUTION INTRAMUSCULAR; INTRAVENOUS AS NEEDED
Status: DISCONTINUED | OUTPATIENT
Start: 2024-02-28 | End: 2024-02-28

## 2024-02-28 RX ORDER — CEFAZOLIN 1 G/1
INJECTION, POWDER, FOR SOLUTION INTRAVENOUS AS NEEDED
Status: DISCONTINUED | OUTPATIENT
Start: 2024-02-28 | End: 2024-02-28

## 2024-02-28 RX ORDER — LABETALOL HYDROCHLORIDE 5 MG/ML
5 INJECTION, SOLUTION INTRAVENOUS ONCE AS NEEDED
Status: DISCONTINUED | OUTPATIENT
Start: 2024-02-28 | End: 2024-02-28 | Stop reason: HOSPADM

## 2024-02-28 RX ORDER — LIDOCAINE HYDROCHLORIDE 10 MG/ML
INJECTION, SOLUTION EPIDURAL; INFILTRATION; INTRACAUDAL; PERINEURAL AS NEEDED
Status: DISCONTINUED | OUTPATIENT
Start: 2024-02-28 | End: 2024-02-28 | Stop reason: HOSPADM

## 2024-02-28 RX ADMIN — DEXAMETHASONE SODIUM PHOSPHATE 8 MG: 4 INJECTION, SOLUTION INTRAMUSCULAR; INTRAVENOUS at 07:42

## 2024-02-28 RX ADMIN — KETOROLAC TROMETHAMINE 30 MG: 30 INJECTION, SOLUTION INTRAMUSCULAR at 07:46

## 2024-02-28 RX ADMIN — LIDOCAINE HYDROCHLORIDE 80 MG: 20 INJECTION, SOLUTION INFILTRATION; PERINEURAL at 07:32

## 2024-02-28 RX ADMIN — PROPOFOL 200 MG: 10 INJECTION, EMULSION INTRAVENOUS at 07:32

## 2024-02-28 RX ADMIN — SODIUM CHLORIDE 100 ML/HR: 9 INJECTION, SOLUTION INTRAVENOUS at 06:17

## 2024-02-28 RX ADMIN — Medication 100 MCG: at 07:49

## 2024-02-28 RX ADMIN — Medication 200 MCG: at 07:55

## 2024-02-28 RX ADMIN — Medication 100 MCG: at 07:48

## 2024-02-28 RX ADMIN — Medication 100 MCG: at 07:53

## 2024-02-28 RX ADMIN — CEFAZOLIN 2 G: 330 INJECTION, POWDER, FOR SOLUTION INTRAMUSCULAR; INTRAVENOUS at 07:30

## 2024-02-28 RX ADMIN — FENTANYL CITRATE 50 MCG: 50 INJECTION, SOLUTION INTRAMUSCULAR; INTRAVENOUS at 07:32

## 2024-02-28 RX ADMIN — ONDANSETRON 4 MG: 2 INJECTION, SOLUTION INTRAMUSCULAR; INTRAVENOUS at 07:46

## 2024-02-28 RX ADMIN — MIDAZOLAM 2 MG: 1 INJECTION INTRAMUSCULAR; INTRAVENOUS at 07:29

## 2024-02-28 RX ADMIN — FENTANYL CITRATE 50 MCG: 50 INJECTION, SOLUTION INTRAMUSCULAR; INTRAVENOUS at 07:33

## 2024-02-28 ASSESSMENT — PAIN - FUNCTIONAL ASSESSMENT
PAIN_FUNCTIONAL_ASSESSMENT: 0-10
PAIN_FUNCTIONAL_ASSESSMENT: WONG-BAKER FACES
PAIN_FUNCTIONAL_ASSESSMENT: 0-10
PAIN_FUNCTIONAL_ASSESSMENT: WONG-BAKER FACES
PAIN_FUNCTIONAL_ASSESSMENT: 0-10

## 2024-02-28 ASSESSMENT — PAIN SCALES - GENERAL
PAINLEVEL_OUTOF10: 0 - NO PAIN
PAIN_LEVEL: 0
PAINLEVEL_OUTOF10: 0 - NO PAIN
PAINLEVEL_OUTOF10: 0 - NO PAIN

## 2024-02-28 ASSESSMENT — COLUMBIA-SUICIDE SEVERITY RATING SCALE - C-SSRS
1. IN THE PAST MONTH, HAVE YOU WISHED YOU WERE DEAD OR WISHED YOU COULD GO TO SLEEP AND NOT WAKE UP?: NO
2. HAVE YOU ACTUALLY HAD ANY THOUGHTS OF KILLING YOURSELF?: NO
6. HAVE YOU EVER DONE ANYTHING, STARTED TO DO ANYTHING, OR PREPARED TO DO ANYTHING TO END YOUR LIFE?: NO

## 2024-02-28 NOTE — ANESTHESIA PROCEDURE NOTES
Airway  Date/Time: 2/28/2024 7:35 AM  Urgency: elective    Airway not difficult    Staffing  Performed: CRNA   Authorized by: Michele Rosario MD    Performed by: SAMINA Hannah-MOI  Patient location during procedure: OR    Indications and Patient Condition  Indications for airway management: anesthesia  Spontaneous Ventilation: absent  Sedation level: deep  Preoxygenated: yes  Mask difficulty assessment: 0 - not attempted  Planned trial extubation    Final Airway Details  Final airway type: supraglottic airway      Successful airway: classic  Size 4     Number of attempts at approach: 1    Additional Comments  atraumatic

## 2024-02-28 NOTE — DISCHARGE INSTRUCTIONS
General Anesthesia Discharge Instructions    About this topic  You may need general anesthesia if you need to be asleep during a procedure. Your doctor will use drugs to block the signals that go from your nerves to your brain. Doctors give general anesthesia during a surgery or procedure to:  Allow you to sleep  Help your body be still  Relax your muscles  Help you to relax and be pain free  Keep you from remembering the surgery  Let the doctor manage your airway, breathing, and blood flow  The doctor or nurse anesthetist gives general anesthesia by a shot into your vein. Sometimes, you may breathe in a gas through a mask placed over your face.  What care is needed at home?  Ask your doctor what you need to do when you go home. Make sure you ask questions if you do not understand what the doctor says.  Your doctor may give you drugs to prevent or treat an upset stomach from the anesthetic. Take them as ordered.  If your throat is sore, suck on ice chips or popsicles to ease throat pain.  Put 2 to 3 pillows under your head and back when you lie down to help you breathe easier.  For the first 24 to 48 hours:  Do not operate heavy or dangerous machinery.  Do not make major decisions or sign important papers. You may not be able to think clearly.  Avoid beer, wine, or mixed drinks.  You are at a higher risk of falling for at least 24 hours after general anesthesia.  Take extra care when you get up.  Do not change positions quickly.  Do not rush when you need to go to the bathroom or to answer the phone.  Ask for help if you feel unsteady when you try to walk.  Wear shoes with non-slip soles and low heels.  What follow-up care is needed?  Your doctor may ask you to come back to the office to check on your progress. Be sure to keep these visits.  If you have stitches that do not dissolve or staples, you will need to have them removed. Your doctor will want to do this in 1 to 2 weeks. If the doctor used skin glue, the  glue will fall off on its own.  What drugs may be needed?  The doctor may order drugs to:  Help with pain  Treat an upset stomach or throwing up  Will physical activity be limited?  You will not be allowed to drive right away after the procedure. Ask a family member or a friend to drive you home.  Avoid trying to get out of bed without help until you are sure of your balance.  You may have to limit your activity. Talk to your doctor about if you need to limit how much you lift or limit exercise after your procedure.  What changes to diet are needed?  Start with a light diet when you are fully awake. This includes things that are easy to swallow like soups, pudding, jello, toast, and eggs. Slowly progress to your normal diet.  What problems could happen?  Low blood pressure  Breathing problems  Upset stomach or throwing up  Dizziness  Blood clots  Infection  When do I need to call the doctor?  Trouble breathing  Upset stomach or throwing up more than 3 times in the next 2 days  Dizziness  Teach Back: Helping You Understand  The Teach Back Method helps you understand the information we are giving you. After you talk with the staff, tell them in your own words what you learned. This helps to make sure the staff has described each thing clearly. It also helps to explain things that may have been confusing. Before going home, make sure you can do these:  I can tell you about my procedure.  I can tell you if I need to follow up with my doctor.  I can tell you what is good for me to eat and drink the next day.  I can tell you what I would do if I have trouble breathing, an upset stomach, or dizziness.  Where can I learn more?  [employer]  https://www.nigms.nih.gov/education/pages/factsheet_Anesthesia.aspx  [facility] Choices  http://www.nhs.uk/conditions/Anaesthetic-general/Pages/Definition.aspx  Last Reviewed Date  2020-04-22

## 2024-02-28 NOTE — INTERVAL H&P NOTE
Interval History and Physical     I have interviewed and examined the patient and reviewed the recent History and Physical.  There have been no changes to the recent H&P documentation.     The patient understands the planned operation and its associated risks and benefits and agrees to proceed.     The surgical consent form has been signed.    Visit Vitals  /75 Comment: RIGHT UPPER ARM LONG CUFF   Pulse 73   Temp 36 °C (96.8 °F) (Temporal)   Resp 16        Efrain Holliday MD

## 2024-02-28 NOTE — OP NOTE
KNEE ARTHROSCOPY ,PARTIAL MEDIAL MENISECTOMY ONLY TRAMODOL POST OP PER EVERT, DIABETIC (R) Operative Note     Date: 2024  OR Location: [facility]    Name: Pankaj Mccloud : 1965, Age: 58 y.o., MRN: [id number], Sex: male    Diagnosis  Pre-op Diagnosis     * Other tear of medial meniscus, current injury, right knee, initial encounter [S83.241A] Post-op Diagnosis     * Other tear of medial meniscus, current injury, right knee, initial encounter [S83.241A]     Procedures  KNEE ARTHROSCOPY ,PARTIAL MEDIAL MENISECTOMY ONLY TRAMODOL POST OP PER EVERT, DIABETIC  83387 - RI ARTHRS KNE SURG W/MENISCECTOMY MED/LAT W/SHVG      Surgeons      * Efrain Holliday - Primary    Resident/Fellow/Other Assistant:  Surgeon(s) and Role:     * Evert Edward PA-C - Assisting    Procedure Summary  Anesthesia: General  ASA: II  Anesthesia Staff: Anesthesiologist: Michele Rosario MD  CRNA: SAMINA Hannah-CRNA  Estimated Blood Loss: Minimal  Intra-op Medications:   Administrations occurring from 0730 to 0815 on 24:   Medication Name Total Dose   sodium chloride 0.9% infusion Cannot be calculated              Anesthesia Record               Intraprocedure I/O Totals          Intake    sodium chloride 0.9% infusion 500.00 mL    Total Intake 500 mL          Specimen: No specimens collected     Staff:   Circulator: Alisha Resendez RN  Scrub Person: Rena Griffiths         Drains and/or Catheters: * None in log *    Tourniquet Times:   * Missing tourniquet times found for documented tourniquets in lo *     Implants:     Findings: Complex tear posterior horn medial meniscus, grade 3 focal chondromalacia medial trochlea    Indications: Pankaj Mccloud is an 58 y.o. male who is having surgery for Other tear of medial meniscus, current injury, right knee, initial encounter [S83.241A].     The patient was seen in the preoperative area. The risks, benefits, complications, treatment options, non-operative alternatives, expected recovery  and outcomes were discussed with the patient. The possibilities of reaction to medication, pulmonary aspiration, injury to surrounding structures, bleeding, recurrent infection, the need for additional procedures, failure to diagnose a condition, and creating a complication requiring transfusion or operation were discussed with the patient. The patient concurred with the proposed plan, giving informed consent.  The site of surgery was properly noted/marked if necessary per policy. The patient has been actively warmed in preoperative area. Preoperative antibiotics have been ordered and given within 1 hours of incision. Venous thrombosis prophylaxis are not indicated.    Procedure Details: Operative findings: (Outerbridge classification 0-4)   Patella: 1  Trochlea: 3 focal  Medial compartment: 2  Lateral compartment: 1     Operative Indications:  Patient was noted to have internal derangement of the knee refractory to non-surgical modalities.  We discussed associated interventions and the risks of the surgery, including DVT/PE, infection, stiffness, medical complications, and incomplete relief of pre-operative symptoms.     Implants:  None     Operative Procedure:  The patient was met prior to surgery and the appropriate extremity was marked.  We reviewed recent health history and found no contraindication to proceeding with the planned procedure.  The patient was transported to the operating room and underwent general anesthesia.  The patient was positioned supine on the operative table with all greyson prominences well-padded.  A non-sterile thigh tourniquet was placed and a padded lateral thigh post.     The leg was prepped and draped in the usual sterile fashion.      A timeout was completed, the patient identified, the site and laterality confirmed long with antibiotic administration was in accordance with standard protocol.     The superficial landmarks of the knee were palpated and anteromedial and anterolateral  portals were created.     A diagnostic arthroscopy was performed utilizing a fluid pump with sterile saline.  The articular surface of the patella, trochlea, medial and lateral femoral condyles and tibial plateaus were evaluated.  The Outerbridge classifications are listed above.  The gutters were evaluated and no loose bodies were noted. The medial compartment was entered with valgus stress in a slightly flexed knee against the lateral post.  The assistant helped with this to facilitate visualization.  The meniscus was probed superiorly and inferiorly using a blunt probe.  The notch was inspected and the ACL and PCL were healthy in appearance and had appropriate tension when probed.  The knee was then flexed into a figure of four position and the lateral compartment was entered.  Lateral meniscus was palpated and was intact     The articular surface of the medial trochlea was noted to have wear and a component of delamination which we felt could contribute to mechanical symptoms.  A 3.5 mm aggressive plus shaver was used to debride the articular cartilage until stable margins were obtained.     The medial meniscus was noted to have a complex tear involving the posterior horn.   Using an aggressive plus shaver and meniscal biters long with an arthroscopic radiofrequency probe, we debrided the meniscus back until we obtained healthy and stable margins.      Being satisfied with the debridement, attention was turned to closure     The knee was irrigated and lavaged to remove and loose meniscal or chondral debris.  A second pass was made diagnostically to confirm removal of any fragments and inspect for any additional pathology.  The residual fluid was expressed from the knee.  The portals were closed using a nylon suture.  Local anesthetic was injected into the soft tissue around the portals. Sterile bandages were placed.  The patient was stable to the recovery room.     I was present and participated in the entire  procedure. The Physician Assistant participated in all critical elements of the procedure under my direct supervision. The surgical incision was closed by the PA under my indirect supervision. There were no qualified residents available to assist.     was present for the entire case.  Given the nature of the procedure and disease process a skilled surgical assistant was necessary for the case.  The assistant was necessary for retraction and helped directly facilitate completion of the surgery.  A certified scrub tech was at the back table managing instruments and supplies for the surgical procedure.       Complications:  None; patient tolerated the procedure well.    Disposition: PACU - hemodynamically stable.  Condition: stable         Additional Details: Not applicable    Attending Attestation: I performed the procedure.    Efrain Holliday  Phone Number: [phone]

## 2024-02-28 NOTE — ANESTHESIA POSTPROCEDURE EVALUATION
Patient: Pankaj Mccloud    Procedure Summary       Date: 02/28/24 Room / Location: Y OR 11 / [facility] OR    Anesthesia Start: 0730 Anesthesia Stop: 0817    Procedure: KNEE ARTHROSCOPY ,PARTIAL MEDIAL MENISECTOMY, CHONDROPLASTY ONLY TRAMODOL POST OP PER ROSANGELA, DIABETIC (Right: Knee) Diagnosis:       Other tear of medial meniscus, current injury, right knee, initial encounter      (Other tear of medial meniscus, current injury, right knee, initial encounter [S83.164A])    Surgeons: Efrain Holliday MD Responsible Provider: Michele Rosario MD    Anesthesia Type: general ASA Status: 2            Anesthesia Type: general    Vitals Value Taken Time   BP 86/57 02/28/24 0817   Temp 36.3 02/28/24 0817   Pulse 77 02/28/24 0817   Resp 12 02/28/24 0817   SpO2 98 02/28/24 0817       Anesthesia Post Evaluation    Patient location during evaluation: bedside  Patient participation: waiting for patient participation  Level of consciousness: sleepy but conscious  Pain score: 0  Pain management: adequate  Airway patency: patent  Cardiovascular status: acceptable and stable  Respiratory status: acceptable, face mask and oral airway  Hydration status: stable  Postoperative Nausea and Vomiting: none        No notable events documented.

## 2024-03-07 ENCOUNTER — OFFICE VISIT (OUTPATIENT)
Dept: ORTHOPEDIC SURGERY | Facility: CLINIC | Age: 59
End: 2024-03-07
Payer: COMMERCIAL

## 2024-03-07 DIAGNOSIS — Z98.890 POST-OPERATIVE STATE: Primary | ICD-10-CM

## 2024-03-07 PROCEDURE — 99024 POSTOP FOLLOW-UP VISIT: CPT | Performed by: ORTHOPAEDIC SURGERY

## 2024-03-07 PROCEDURE — 4010F ACE/ARB THERAPY RXD/TAKEN: CPT | Performed by: ORTHOPAEDIC SURGERY

## 2024-03-07 PROCEDURE — 1036F TOBACCO NON-USER: CPT | Performed by: ORTHOPAEDIC SURGERY

## 2024-03-07 ASSESSMENT — PAIN - FUNCTIONAL ASSESSMENT: PAIN_FUNCTIONAL_ASSESSMENT: NO/DENIES PAIN

## 2024-03-07 NOTE — PROGRESS NOTES
No chief complaint on file.      The patient is 1 weeks status post knee arthroscopy.  Their pain is mild.    Physical examination:    The portal sites are healed.  There is  no  effusion.  No erythema or warmth.  Knee range of motion  0-115 degrees .  Calf is soft, Homans negative.    Impression: Status post side: right knee arthroscopy with partial medial meniscectomy and chondroplasty    Plan:  Sutures removed  Intraoperative images reviewed with the patient  Continue working on range of motion on his own  Physical therapy: No  Follow up in 3 weeks  All questions answered

## 2024-03-29 ENCOUNTER — APPOINTMENT (OUTPATIENT)
Dept: ORTHOPEDIC SURGERY | Facility: CLINIC | Age: 59
End: 2024-03-29
Payer: COMMERCIAL

## (undated) DEVICE — MAT, FLOOR, STANDARD FLUID BARRIER, 32X44, GREEN

## (undated) DEVICE — GOWN, SURGICAL, ROYAL SILK, XXL, STERILE

## (undated) DEVICE — STRAP, ARM BOARD, 32 X 1.5

## (undated) DEVICE — GLOVE, SURGICAL, PROTEXIS PI , 7.5, PF, LF

## (undated) DEVICE — SUTURE, ETHILON, 3-0, 18 IN, PS1, BLACK

## (undated) DEVICE — APPLICATOR, CHLORAPREP, W/ORANGE TINT, 26ML

## (undated) DEVICE — PROBE, SERFAS, 3.5MM, 50 S, ENERGY SUCTION SYSTEM

## (undated) DEVICE — Device

## (undated) DEVICE — GLOVE, SURGICAL, PROTEXIS PI , 8.5, PF, LF

## (undated) DEVICE — GLOVE, SURGICAL, PROTEXIS PI BLUE W/NEUTHERA, 7.5, PF, LF

## (undated) DEVICE — DRESSING, GAUZE, PETROLATUM, STRIP, XEROFORM, 1 X 8 IN, STERILE

## (undated) DEVICE — POSITIONER, WELL LEG HOLDER

## (undated) DEVICE — TUBING, PATIENT 8FT STERILE

## (undated) DEVICE — COVER, MAYO STAND, W/PAD, 23 IN, DISPOSABLE, PLASTIC, LF, STERILE

## (undated) DEVICE — TUBING, PUMP REDEUCE 8FT STERILE

## (undated) DEVICE — MANIFOLD, 4 PORT NEPTUNE STANDARD

## (undated) DEVICE — STRAP, VELCRO, BODY, 4 X 60IN, NS

## (undated) DEVICE — TUBING, SUCTION, 6MM X 10, CLEAN N-COND

## (undated) DEVICE — DRESSING, ABDOMINAL PAD, CURITY, 7.5 X 8 IN

## (undated) DEVICE — CUFF, TOURNIQUET, DUAL PORT, SNG BLADDER, 30 IN, PLC